# Patient Record
Sex: MALE | Race: WHITE | NOT HISPANIC OR LATINO | Employment: UNEMPLOYED | URBAN - NONMETROPOLITAN AREA
[De-identification: names, ages, dates, MRNs, and addresses within clinical notes are randomized per-mention and may not be internally consistent; named-entity substitution may affect disease eponyms.]

---

## 2018-11-22 ENCOUNTER — APPOINTMENT (OUTPATIENT)
Dept: GENERAL RADIOLOGY | Facility: HOSPITAL | Age: 43
End: 2018-11-22

## 2018-11-22 ENCOUNTER — HOSPITAL ENCOUNTER (EMERGENCY)
Facility: HOSPITAL | Age: 43
Discharge: HOME | End: 2018-11-22
Attending: FAMILY MEDICINE

## 2018-11-22 ENCOUNTER — APPOINTMENT (OUTPATIENT)
Dept: CT IMAGING | Facility: HOSPITAL | Age: 43
End: 2018-11-22

## 2018-11-22 VITALS
OXYGEN SATURATION: 98 % | TEMPERATURE: 98.6 F | WEIGHT: 132 LBS | HEART RATE: 52 BPM | HEIGHT: 65 IN | RESPIRATION RATE: 16 BRPM | BODY MASS INDEX: 21.99 KG/M2 | DIASTOLIC BLOOD PRESSURE: 77 MMHG | SYSTOLIC BLOOD PRESSURE: 135 MMHG

## 2018-11-22 DIAGNOSIS — R51.9 GENERALIZED HEADACHES: Primary | ICD-10-CM

## 2018-11-22 LAB
ALBUMIN SERPL-MCNC: 4.4 G/DL (ref 3.5–5)
ALBUMIN/GLOB SERPL: 1.6 G/DL (ref 1.5–2.5)
ALP SERPL-CCNC: 76 U/L (ref 40–129)
ALT SERPL W P-5'-P-CCNC: 23 U/L (ref 10–44)
ANION GAP SERPL CALCULATED.3IONS-SCNC: 0.9 MMOL/L (ref 3.6–11.2)
AST SERPL-CCNC: 23 U/L (ref 10–34)
BACTERIA UR QL AUTO: ABNORMAL /HPF
BASOPHILS # BLD AUTO: 0.03 10*3/MM3 (ref 0–0.3)
BASOPHILS NFR BLD AUTO: 0.3 % (ref 0–2)
BILIRUB SERPL-MCNC: 0.6 MG/DL (ref 0.2–1.8)
BILIRUB UR QL STRIP: NEGATIVE
BUN BLD-MCNC: 12 MG/DL (ref 7–21)
BUN/CREAT SERPL: 12.1 (ref 7–25)
CALCIUM SPEC-SCNC: 9.4 MG/DL (ref 7.7–10)
CHLORIDE SERPL-SCNC: 110 MMOL/L (ref 99–112)
CLARITY UR: CLEAR
CO2 SERPL-SCNC: 26.1 MMOL/L (ref 24.3–31.9)
COLOR UR: YELLOW
CREAT BLD-MCNC: 0.99 MG/DL (ref 0.43–1.29)
DEPRECATED RDW RBC AUTO: 41.8 FL (ref 37–54)
EOSINOPHIL # BLD AUTO: 0.22 10*3/MM3 (ref 0–0.7)
EOSINOPHIL NFR BLD AUTO: 2.1 % (ref 0–5)
ERYTHROCYTE [DISTWIDTH] IN BLOOD BY AUTOMATED COUNT: 12.6 % (ref 11.5–14.5)
GFR SERPL CREATININE-BSD FRML MDRD: 83 ML/MIN/1.73
GLOBULIN UR ELPH-MCNC: 2.7 GM/DL
GLUCOSE BLD-MCNC: 99 MG/DL (ref 70–110)
GLUCOSE UR STRIP-MCNC: NEGATIVE MG/DL
HCT VFR BLD AUTO: 42.7 % (ref 42–52)
HGB BLD-MCNC: 14.9 G/DL (ref 14–18)
HGB UR QL STRIP.AUTO: NEGATIVE
HYALINE CASTS UR QL AUTO: ABNORMAL /LPF
IMM GRANULOCYTES # BLD: 0.02 10*3/MM3 (ref 0–0.03)
IMM GRANULOCYTES NFR BLD: 0.2 % (ref 0–0.5)
KETONES UR QL STRIP: NEGATIVE
LEUKOCYTE ESTERASE UR QL STRIP.AUTO: ABNORMAL
LYMPHOCYTES # BLD AUTO: 2.65 10*3/MM3 (ref 1–3)
LYMPHOCYTES NFR BLD AUTO: 25.2 % (ref 21–51)
MCH RBC QN AUTO: 31.6 PG (ref 27–33)
MCHC RBC AUTO-ENTMCNC: 34.9 G/DL (ref 33–37)
MCV RBC AUTO: 90.7 FL (ref 80–94)
MONOCYTES # BLD AUTO: 1.15 10*3/MM3 (ref 0.1–0.9)
MONOCYTES NFR BLD AUTO: 11 % (ref 0–10)
NEUTROPHILS # BLD AUTO: 6.43 10*3/MM3 (ref 1.4–6.5)
NEUTROPHILS NFR BLD AUTO: 61.2 % (ref 30–70)
NITRITE UR QL STRIP: NEGATIVE
OSMOLALITY SERPL CALC.SUM OF ELEC: 273.6 MOSM/KG (ref 273–305)
PH UR STRIP.AUTO: 8 [PH] (ref 5–8)
PLATELET # BLD AUTO: 271 10*3/MM3 (ref 130–400)
PMV BLD AUTO: 9.2 FL (ref 6–10)
POTASSIUM BLD-SCNC: 4.4 MMOL/L (ref 3.5–5.3)
PROT SERPL-MCNC: 7.1 G/DL (ref 6–8)
PROT UR QL STRIP: NEGATIVE
RBC # BLD AUTO: 4.71 10*6/MM3 (ref 4.7–6.1)
RBC # UR: ABNORMAL /HPF
REF LAB TEST METHOD: ABNORMAL
SODIUM BLD-SCNC: 137 MMOL/L (ref 135–153)
SP GR UR STRIP: 1.02 (ref 1–1.03)
SQUAMOUS #/AREA URNS HPF: ABNORMAL /HPF
UROBILINOGEN UR QL STRIP: ABNORMAL
WBC NRBC COR # BLD: 10.5 10*3/MM3 (ref 4.5–12.5)
WBC UR QL AUTO: ABNORMAL /HPF

## 2018-11-22 PROCEDURE — 80053 COMPREHEN METABOLIC PANEL: CPT | Performed by: PHYSICIAN ASSISTANT

## 2018-11-22 PROCEDURE — 71045 X-RAY EXAM CHEST 1 VIEW: CPT

## 2018-11-22 PROCEDURE — 99284 EMERGENCY DEPT VISIT MOD MDM: CPT

## 2018-11-22 PROCEDURE — 85025 COMPLETE CBC W/AUTO DIFF WBC: CPT | Performed by: PHYSICIAN ASSISTANT

## 2018-11-22 PROCEDURE — 36415 COLL VENOUS BLD VENIPUNCTURE: CPT

## 2018-11-22 PROCEDURE — 70450 CT HEAD/BRAIN W/O DYE: CPT | Performed by: RADIOLOGY

## 2018-11-22 PROCEDURE — 81001 URINALYSIS AUTO W/SCOPE: CPT | Performed by: PHYSICIAN ASSISTANT

## 2018-11-22 PROCEDURE — 70450 CT HEAD/BRAIN W/O DYE: CPT

## 2018-11-22 PROCEDURE — 71045 X-RAY EXAM CHEST 1 VIEW: CPT | Performed by: RADIOLOGY

## 2018-11-23 NOTE — ED PROVIDER NOTES
Subjective   43-year-old male presents to the ER with multiple complaints.  Patient admits that he has a headache that has presented in the occipital area as well as in the center parietal area of the head.  Patientheadaches come and go.  Currently the patient denies headache.  Patient does admit to being a heavy smoker of over 3 packs a day.  Patient denies any chest pain.  Patient continues to state that he has a day off work and just wants to be checked out.  Discussed with patient that ultimately a lot of his complaints need to be addressed by his primary care provider.  Patient understands.            Review of Systems   Constitutional: Negative.  Negative for fever.   Respiratory: Negative.    Cardiovascular: Negative.  Negative for chest pain.   Gastrointestinal: Negative.  Negative for abdominal pain.   Endocrine: Negative.    Genitourinary: Negative.  Negative for dysuria.   Skin: Negative.    Neurological: Positive for headaches.   Psychiatric/Behavioral: Negative.    All other systems reviewed and are negative.      History reviewed. No pertinent past medical history.    Allergies   Allergen Reactions   • Diphenhydramine Delirium       Past Surgical History:   Procedure Laterality Date   • HERNIA REPAIR         History reviewed. No pertinent family history.    Social History     Socioeconomic History   • Marital status: Single     Spouse name: Not on file   • Number of children: Not on file   • Years of education: Not on file   • Highest education level: Not on file   Tobacco Use   • Smoking status: Heavy Tobacco Smoker   Substance and Sexual Activity   • Alcohol use: No     Frequency: Never   • Drug use: No           Objective   Physical Exam   Constitutional: He is oriented to person, place, and time. He appears well-developed and well-nourished. No distress.   HENT:   Head: Normocephalic and atraumatic.   Right Ear: External ear normal.   Left Ear: External ear normal.   Nose: Nose normal.   Eyes:  Conjunctivae and EOM are normal. Pupils are equal, round, and reactive to light.   Neck: Normal range of motion. Neck supple. No JVD present. No tracheal deviation present.   Cardiovascular: Normal rate, regular rhythm and normal heart sounds.   No murmur heard.  Pulmonary/Chest: Effort normal and breath sounds normal. No respiratory distress. He has no wheezes.   Abdominal: Soft. Bowel sounds are normal. There is no tenderness.   Musculoskeletal: Normal range of motion. He exhibits no edema or deformity.   Neurological: He is alert and oriented to person, place, and time. No cranial nerve deficit.   Skin: Skin is warm and dry. No rash noted. He is not diaphoretic. No erythema. No pallor.   Psychiatric: He has a normal mood and affect. His behavior is normal. Thought content normal.   Nursing note and vitals reviewed.      Procedures           ED Course  ED Course as of Nov 23 1455   Thu Nov 22, 2018   1643 CXR by Dr. Moscoso: No apparent acute cardiopulmonary disease.  [RB]   1814 CT head interpreted by virtual radiology: No acute findings.  [RB]      ED Course User Index  [RB] Kaleb Boyer PA                  MDM  Number of Diagnoses or Management Options  Generalized headaches: new and requires workup     Amount and/or Complexity of Data Reviewed  Clinical lab tests: ordered and reviewed  Tests in the radiology section of CPT®: ordered and reviewed  Discuss the patient with other providers: yes    Risk of Complications, Morbidity, and/or Mortality  Presenting problems: low  Diagnostic procedures: low  Management options: low    Patient Progress  Patient progress: stable        Final diagnoses:   Generalized headaches            Kaleb Boyer PA  11/23/18 [id number]

## 2019-06-12 ENCOUNTER — OFFICE VISIT (OUTPATIENT)
Dept: PHARMACY | Facility: HOSPITAL | Age: 44
End: 2019-06-12

## 2019-06-12 VITALS
HEART RATE: 64 BPM | HEIGHT: 65 IN | DIASTOLIC BLOOD PRESSURE: 82 MMHG | WEIGHT: 141 LBS | OXYGEN SATURATION: 100 % | SYSTOLIC BLOOD PRESSURE: 131 MMHG | BODY MASS INDEX: 23.49 KG/M2

## 2019-06-12 DIAGNOSIS — B19.20 HEPATITIS C VIRUS INFECTION, UNSPECIFIED CHRONICITY: Primary | ICD-10-CM

## 2019-06-12 DIAGNOSIS — Z86.19 HISTORY OF HEPATITIS A: ICD-10-CM

## 2019-06-12 LAB
ALBUMIN SERPL-MCNC: 3.9 G/DL (ref 3.5–5.2)
ALBUMIN/GLOB SERPL: 1 G/DL
ALP SERPL-CCNC: 136 U/L (ref 39–117)
ALPHA-FETOPROTEIN: 4.52 NG/ML (ref 0–8.3)
ALT SERPL W P-5'-P-CCNC: 155 U/L (ref 1–41)
ANION GAP SERPL CALCULATED.3IONS-SCNC: 12.9 MMOL/L
AST SERPL-CCNC: 74 U/L (ref 1–40)
BILIRUB SERPL-MCNC: 1.3 MG/DL (ref 0.2–1.2)
BUN BLD-MCNC: 10 MG/DL (ref 6–20)
BUN/CREAT SERPL: 9.2 (ref 7–25)
CALCIUM SPEC-SCNC: 9.6 MG/DL (ref 8.6–10.5)
CHLORIDE SERPL-SCNC: 102 MMOL/L (ref 98–107)
CO2 SERPL-SCNC: 26.1 MMOL/L (ref 22–29)
CREAT BLD-MCNC: 1.09 MG/DL (ref 0.76–1.27)
DEPRECATED RDW RBC AUTO: 52.5 FL (ref 37–54)
ERYTHROCYTE [DISTWIDTH] IN BLOOD BY AUTOMATED COUNT: 15.3 % (ref 12.3–15.4)
GFR SERPL CREATININE-BSD FRML MDRD: 74 ML/MIN/1.73
GLOBULIN UR ELPH-MCNC: 4 GM/DL
GLUCOSE BLD-MCNC: 71 MG/DL (ref 65–99)
HBV SURFACE AB SER RIA-ACNC: NORMAL
HCT VFR BLD AUTO: 41.5 % (ref 37.5–51)
HGB BLD-MCNC: 14.1 G/DL (ref 13–17.7)
HIV1+2 AB SER QL: NORMAL
INR PPP: 0.89 (ref 0.9–1.1)
MCH RBC QN AUTO: 31.8 PG (ref 26.6–33)
MCHC RBC AUTO-ENTMCNC: 34 G/DL (ref 31.5–35.7)
MCV RBC AUTO: 93.7 FL (ref 79–97)
PLATELET # BLD AUTO: 352 10*3/MM3 (ref 140–450)
PMV BLD AUTO: 9.7 FL (ref 6–12)
POTASSIUM BLD-SCNC: 3.9 MMOL/L (ref 3.5–5.2)
PROT SERPL-MCNC: 7.9 G/DL (ref 6–8.5)
PROTHROMBIN TIME: 12.5 SECONDS (ref 11–15.4)
RBC # BLD AUTO: 4.43 10*6/MM3 (ref 4.14–5.8)
SODIUM BLD-SCNC: 141 MMOL/L (ref 136–145)
WBC NRBC COR # BLD: 8.29 10*3/MM3 (ref 3.4–10.8)

## 2019-06-12 PROCEDURE — 36415 COLL VENOUS BLD VENIPUNCTURE: CPT

## 2019-06-12 PROCEDURE — 81596 NFCT DS CHRNC HCV 6 ASSAYS: CPT

## 2019-06-12 PROCEDURE — 99243 OFF/OP CNSLTJ NEW/EST LOW 30: CPT | Performed by: PHYSICIAN ASSISTANT

## 2019-06-12 PROCEDURE — 85027 COMPLETE CBC AUTOMATED: CPT

## 2019-06-12 PROCEDURE — 87522 HEPATITIS C REVRS TRNSCRPJ: CPT

## 2019-06-12 PROCEDURE — 86708 HEPATITIS A ANTIBODY: CPT

## 2019-06-12 PROCEDURE — 80053 COMPREHEN METABOLIC PANEL: CPT

## 2019-06-12 PROCEDURE — 82105 ALPHA-FETOPROTEIN SERUM: CPT

## 2019-06-12 PROCEDURE — 86704 HEP B CORE ANTIBODY TOTAL: CPT

## 2019-06-12 PROCEDURE — 85610 PROTHROMBIN TIME: CPT

## 2019-06-12 PROCEDURE — 86592 SYPHILIS TEST NON-TREP QUAL: CPT

## 2019-06-12 PROCEDURE — 86706 HEP B SURFACE ANTIBODY: CPT

## 2019-06-12 PROCEDURE — 80074 ACUTE HEPATITIS PANEL: CPT

## 2019-06-12 PROCEDURE — G0432 EIA HIV-1/HIV-2 SCREEN: HCPCS

## 2019-06-12 NOTE — PROGRESS NOTES
: 1975    Chief Complaint   Patient presents with   • Hepatitis C       Rico Loaiza is a 43 y.o. male who presents to the office today as a consultation from JANAY Hanks for evaluation of Hepatitis C.    History of Present Illness:  He has known about having Hep C infection for approximately 1 month. No previous IVDU. Does report intranasal drug use in the past, approx 2 months ago. He has been using marijuana daily since he found out about the hepatitis. He states that it helped to relieve his itching which was very severe. This is some better now. He was diagnosed with lung infection at first and was given antibiotics and thinks he could have been allergic to the antibiotic and was given steroids. He was evaluated in the ED and was jaundice at [facility], diagnosed with acute hepatitis (there was some mention of Hep A at that time). He does not drink alcohol, no past history of alcoholism. No known family history of cirrhosis. No known prior Hepatitis B. Thinks he had liver imaging while he was in the ED. Does report vague history of genital lesions and never had testing regarding this.    Labs from PCP 3/2018:  H/H normal   Platelets normal  AST 14  ALT 16  AP 80  Bilirubin normal  Thyroid normal  Hgb A1c 5.6  Total cholesterol normal  TG normal    Review of Systems   Constitutional: Positive for activity change, chills, fever and unexpected weight change.   HENT: Negative for sore throat and trouble swallowing.    Eyes: Negative.    Respiratory: Positive for chest tightness and shortness of breath.    Cardiovascular: Positive for chest pain. Negative for leg swelling.   Gastrointestinal: Positive for abdominal distention, abdominal pain, anal bleeding, nausea and vomiting. Negative for blood in stool, constipation, diarrhea and rectal pain.   Endocrine: Negative.    Genitourinary: Positive for difficulty urinating and hematuria.   Musculoskeletal: Positive for back pain, neck pain and neck stiffness.  "  Skin: Negative for color change and rash.   Allergic/Immunologic: Negative for environmental allergies and food allergies.   Neurological: Positive for dizziness and headaches. Negative for seizures.   Hematological: Does not bruise/bleed easily.   Psychiatric/Behavioral: Positive for agitation and sleep disturbance. Negative for suicidal ideas. The patient is nervous/anxious.        Past Medical History:   Diagnosis Date   • GERD (gastroesophageal reflux disease)    • Infectious viral hepatitis        Past Surgical History:   Procedure Laterality Date   • HERNIA REPAIR     • HERNIA REPAIR         Family History   Problem Relation Age of Onset   • Irritable bowel syndrome Mother    • Colon cancer Paternal Grandfather    • Colon polyps Paternal Grandfather        Social History     Socioeconomic History   • Marital status: Single     Spouse name: Not on file   • Number of children: Not on file   • Years of education: Not on file   • Highest education level: Not on file   Tobacco Use   • Smoking status: Current Every Day Smoker     Packs/day: 2.00     Years: 15.00     Pack years: 30.00   • Smokeless tobacco: Never Used   Substance and Sexual Activity   • Sexual activity: No         Current Outpatient Medications:   •  Arginine 500 MG capsule, Take  by mouth., Disp: , Rfl:   •  Misc Natural Products (NF FORMULAS TESTOSTERONE) capsule, Take  by mouth., Disp: , Rfl:     Allergies:   Diphenhydramine    Vitals:  /82   Pulse 64   Ht 165.1 cm (65\")   Wt 64 kg (141 lb)   SpO2 100%   BMI 23.46 kg/m²     Physical Exam   Constitutional: He is oriented to person, place, and time. He appears well-developed and well-nourished. No distress.   HENT:   Head: Normocephalic and atraumatic.   Nose: Nose normal.   Mouth/Throat: Oropharynx is clear and moist.   Eyes: Conjunctivae are normal. Right eye exhibits no discharge. Left eye exhibits no discharge. No scleral icterus.   Neck: Normal range of motion. No JVD present. "   Cardiovascular: Normal rate, regular rhythm and normal heart sounds. Exam reveals no gallop and no friction rub.   No murmur heard.  Pulmonary/Chest: Effort normal and breath sounds normal. No respiratory distress. He has no wheezes. He has no rales. He exhibits no tenderness.   Abdominal: Soft. Bowel sounds are normal. He exhibits no mass. There is no tenderness.   Musculoskeletal: Normal range of motion. He exhibits no edema or deformity.   Neurological: He is alert and oriented to person, place, and time. Coordination normal.   Skin: Skin is warm and dry. No rash noted. He is not diaphoretic. No erythema.   Mild jaundice   Psychiatric: He has a normal mood and affect. His behavior is normal. Judgment and thought content normal.   Vitals reviewed.      Assessment/Plan:  1. Hepatitis C virus infection, unspecified chronicity    2. History of hepatitis A      Orders Placed This Encounter   Procedures   • AFP Tumor Marker   • CBC (No Diff)   • Comprehensive Metabolic Panel   • HCV FibroSURE   • HCV RNA By PCR, Qn Rfx Sade   • Hepatitis A Antibody, Total   • Hepatitis B Core Antibody, Total   • Hepatitis B Surface Antibody   • Hepatitis Panel, Acute   • HIV-1 / O / 2 Ag / Antibody 4th Generation   • Protime-INR   • RPR     Labs from PCP do not show positive Hep C. He will have labs today for further evaluation of recent Hep A and reported Hep C. If he truly has Hep C infection, we will need to wait for resolution of acute Hep A before starting direct acting antivirals. He will be checked for hep B and immunity to Hep B as well. He was directed to abstain from any illicit drug use or alcohol use.     We will request copies of [facility] imaging for review.        Follow up will be dependent on lab results.       Electronically signed 6/14/2019 2:23 PM  Esperanza Aguirre PA-C, [facility]

## 2019-06-13 LAB
HAV AB SER QL IA: POSITIVE
HAV IGM SERPL QL IA: POSITIVE
HBV CORE AB SER DONR QL IA: NEGATIVE
HBV CORE IGM SERPL QL IA: NEGATIVE
HBV SURFACE AG SERPL QL IA: NEGATIVE
HCV AB S/CO SERPL IA: 0.1 S/CO RATIO (ref 0–0.9)
RPR SER QL: NORMAL

## 2019-06-14 ENCOUNTER — TELEPHONE (OUTPATIENT)
Dept: PHARMACY | Facility: HOSPITAL | Age: 44
End: 2019-06-14

## 2019-06-14 ENCOUNTER — TELEPHONE (OUTPATIENT)
Dept: GASTROENTEROLOGY | Facility: CLINIC | Age: 44
End: 2019-06-14

## 2019-06-14 LAB
A2 MACROGLOB SERPL-MCNC: 212 MG/DL (ref 110–276)
ALT SERPL W P-5'-P-CCNC: 200 IU/L (ref 0–55)
APO A-I SERPL-MCNC: 112 MG/DL (ref 101–178)
BILIRUB SERPL-MCNC: 1.2 MG/DL (ref 0–1.2)
FIBROSIS SCORING:: ABNORMAL
FIBROSIS STAGE SERPL QL: ABNORMAL
GGT SERPL-CCNC: 129 IU/L (ref 0–65)
HAPTOGLOB SERPL-MCNC: 122 MG/DL (ref 34–200)
HCV AB SER QL: ABNORMAL
LABORATORY COMMENT REPORT: ABNORMAL
LIMITATIONS:: ABNORMAL
LIVER FIBR SCORE SERPL CALC.FIBROSURE: 0.61 (ref 0–0.21)
NECROINFLAMM ACTIVITY SCORING:: ABNORMAL
NECROINFLAMMATORY ACT GRADE SERPL QL: ABNORMAL
NECROINFLAMMATORY ACT SCORE SERPL: 0.87 (ref 0–0.17)

## 2019-06-15 LAB
HCV GENTYP SERPL NAA+PROBE: NORMAL
HCV RNA SERPL NAA+PROBE-ACNC: NORMAL IU/ML
HCV RNA SERPL NAA+PROBE-LOG IU: NORMAL LOG10 IU/ML
REF LAB TEST REF RANGE: NORMAL

## 2019-06-17 ENCOUNTER — TELEPHONE (OUTPATIENT)
Dept: GASTROENTEROLOGY | Facility: CLINIC | Age: 44
End: 2019-06-17

## 2019-06-17 NOTE — TELEPHONE ENCOUNTER
Please arrange appt with me at hepatitis clinic in approx 4-6 weeks. Let him know that results are showing negative for Hep C and positive for Hep A.

## 2019-06-27 NOTE — TELEPHONE ENCOUNTER
Patient aware of results and follow up visit is scheduled.  Patient will be back in the clinic to see you on 7/17/19.

## 2019-07-31 ENCOUNTER — OFFICE VISIT (OUTPATIENT)
Dept: PHARMACY | Facility: HOSPITAL | Age: 44
End: 2019-07-31

## 2019-07-31 VITALS
SYSTOLIC BLOOD PRESSURE: 110 MMHG | WEIGHT: 141 LBS | OXYGEN SATURATION: 100 % | HEIGHT: 65 IN | DIASTOLIC BLOOD PRESSURE: 68 MMHG | HEART RATE: 51 BPM | BODY MASS INDEX: 23.49 KG/M2

## 2019-07-31 DIAGNOSIS — Z23 NEED FOR HEPATITIS B VACCINATION: ICD-10-CM

## 2019-07-31 DIAGNOSIS — R74.8 ABNORMAL AST AND ALT: ICD-10-CM

## 2019-07-31 DIAGNOSIS — Z86.19 HISTORY OF HEPATITIS A: Primary | ICD-10-CM

## 2019-07-31 PROCEDURE — 86709 HEPATITIS A IGM ANTIBODY: CPT

## 2019-07-31 PROCEDURE — 99214 OFFICE O/P EST MOD 30 MIN: CPT | Performed by: PHYSICIAN ASSISTANT

## 2019-07-31 PROCEDURE — 36415 COLL VENOUS BLD VENIPUNCTURE: CPT

## 2019-07-31 PROCEDURE — 80076 HEPATIC FUNCTION PANEL: CPT

## 2019-08-01 ENCOUNTER — TELEPHONE (OUTPATIENT)
Dept: GASTROENTEROLOGY | Facility: CLINIC | Age: 44
End: 2019-08-01

## 2019-08-01 LAB
ALBUMIN SERPL-MCNC: 4.5 G/DL (ref 3.5–5.2)
ALP SERPL-CCNC: 104 U/L (ref 39–117)
ALT SERPL W P-5'-P-CCNC: 123 U/L (ref 1–41)
AST SERPL-CCNC: 68 U/L (ref 1–40)
BILIRUB CONJ SERPL-MCNC: 0.2 MG/DL (ref 0.2–0.3)
BILIRUB INDIRECT SERPL-MCNC: 0.4 MG/DL
BILIRUB SERPL-MCNC: 0.6 MG/DL (ref 0.2–1.2)
HAV IGM SERPL QL IA: REACTIVE
PROT SERPL-MCNC: 8 G/DL (ref 6–8.5)

## 2019-08-01 NOTE — TELEPHONE ENCOUNTER
Please let pt know liver enzymes are improving but still elevated. He does still have detectable Hep A. I recommend that he have labs with PCP in 6 months to make sure they are back to normal.

## 2021-10-07 ENCOUNTER — HOSPITAL ENCOUNTER (OUTPATIENT)
Dept: HOSPITAL 79 - KOH-I | Age: 46
End: 2021-10-07
Attending: NURSE PRACTITIONER
Payer: COMMERCIAL

## 2021-10-07 DIAGNOSIS — M47.816: ICD-10-CM

## 2021-10-07 DIAGNOSIS — M54.40: Primary | ICD-10-CM

## 2023-04-28 ENCOUNTER — OFFICE VISIT (OUTPATIENT)
Dept: CARDIOLOGY | Facility: CLINIC | Age: 48
End: 2023-04-28
Payer: COMMERCIAL

## 2023-04-28 VITALS
SYSTOLIC BLOOD PRESSURE: 115 MMHG | WEIGHT: 140 LBS | RESPIRATION RATE: 16 BRPM | HEART RATE: 57 BPM | HEIGHT: 65 IN | DIASTOLIC BLOOD PRESSURE: 69 MMHG | BODY MASS INDEX: 23.32 KG/M2 | OXYGEN SATURATION: 97 %

## 2023-04-28 DIAGNOSIS — E78.5 DYSLIPIDEMIA: ICD-10-CM

## 2023-04-28 DIAGNOSIS — R06.09 DYSPNEA ON EXERTION: Primary | ICD-10-CM

## 2023-04-28 DIAGNOSIS — R07.2 PRECORDIAL PAIN: ICD-10-CM

## 2023-04-28 DIAGNOSIS — Z72.0 TOBACCO ABUSE: ICD-10-CM

## 2023-04-28 PROCEDURE — 93000 ELECTROCARDIOGRAM COMPLETE: CPT | Performed by: INTERNAL MEDICINE

## 2023-04-28 PROCEDURE — 1160F RVW MEDS BY RX/DR IN RCRD: CPT | Performed by: INTERNAL MEDICINE

## 2023-04-28 PROCEDURE — 1159F MED LIST DOCD IN RCRD: CPT | Performed by: INTERNAL MEDICINE

## 2023-04-28 PROCEDURE — 99204 OFFICE O/P NEW MOD 45 MIN: CPT | Performed by: INTERNAL MEDICINE

## 2023-04-28 RX ORDER — CHLORAL HYDRATE 500 MG
2000 CAPSULE ORAL EVERY 12 HOURS SCHEDULED
COMMUNITY
Start: 2023-04-18

## 2023-04-28 RX ORDER — NAPROXEN 500 MG/1
500 TABLET ORAL TAKE AS DIRECTED
COMMUNITY
Start: 2023-04-18

## 2023-04-28 RX ORDER — NICOTINE 21 MG/24HR
PATCH, TRANSDERMAL 24 HOURS TRANSDERMAL TAKE AS DIRECTED
COMMUNITY
Start: 2023-02-23

## 2023-04-28 RX ORDER — METHOCARBAMOL 750 MG/1
750 TABLET, FILM COATED ORAL 3 TIMES DAILY
COMMUNITY
Start: 2023-04-18

## 2023-04-28 RX ORDER — ASPIRIN 81 MG/1
81 TABLET ORAL DAILY
Qty: 30 TABLET | Refills: 2 | Status: SHIPPED | OUTPATIENT
Start: 2023-04-28

## 2023-04-28 NOTE — PROGRESS NOTES
Fany Rocha APRN  Rico Loaiza  1975  2023    There is no problem list on file for this patient.      Dear Fany Rocha APRN:    Subjective     Rico Loaiza is a 47 y.o. male with the problems as listed above, presents    Chief complaint: Increasing shortness of breath and intermittent chest pain and tightness.    History of Present Illness: Mr. Loaiza is a pleasant 47-year-old  male with no history of known heart disease or coronary artery disease, has history of smoking of up to 2 to 3 packs a day for the last 25 years, nonhypertensive and nondiabetic but has dyslipidemia, presents with complaints of having increasing shortness of breath over the last several months.  He seemed to get short of breath currently with moderate exertion with no PND, orthopnea or pedal edema.  He also complains of intermittent episodes of chest pain and tightness that seem to occur at random both with and without exertion.  This is of moderate intensity and seem to be associated with some shortness of breath.  It usually resolves spontaneously when he takes a deep breath and relaxes.  He denies any palpitation but has some intermittent dizziness.  No syncope.  His father reportedly  from heart attack at age 74.    Cardiac risk factors:hypercholesterolemia, smoking, Positive family Hx. of premature athersclerotivc disease. and Age and male gender    Allergies   Allergen Reactions   • Diphenhydramine Delirium   :    Current Outpatient Medications:   •  methocarbamol (ROBAXIN) 750 MG tablet, Take 1 tablet by mouth 3 (Three) Times a Day., Disp: , Rfl:   •  Misc Natural Products (NF FORMULAS TESTOSTERONE) capsule, Take  by mouth., Disp: , Rfl:   •  naproxen (NAPROSYN) 500 MG tablet, Take 1 tablet by mouth Take As Directed., Disp: , Rfl:   •  nicotine (NICODERM CQ) 21 MG/24HR patch, Take As Directed., Disp: , Rfl:   •  Omega-3 Fatty Acids (fish oil) 1000 MG capsule capsule, Take 2 capsules by mouth  "Every 12 (Twelve) Hours., Disp: , Rfl:   •  aspirin 81 MG EC tablet, Take 1 tablet by mouth Daily., Disp: 30 tablet, Rfl: 2    Past Medical History:   Diagnosis Date   • GERD (gastroesophageal reflux disease)    • Infectious viral hepatitis      Past Surgical History:   Procedure Laterality Date   • HERNIA REPAIR     • HERNIA REPAIR       Family History   Problem Relation Age of Onset   • Irritable bowel syndrome Mother    • Colon cancer Paternal Grandfather    • Colon polyps Paternal Grandfather      Social History     Tobacco Use   • Smoking status: Every Day     Packs/day: 2.00     Years: 15.00     Pack years: 30.00     Types: Cigarettes   • Smokeless tobacco: Never   Substance Use Topics   • Alcohol use: Never   • Drug use: Yes     Types: Marijuana     Review of Systems   Constitutional: Positive for malaise/fatigue.   Eyes: Positive for blurred vision and visual disturbance.   Cardiovascular: Positive for chest pain.   Respiratory: Positive for shortness of breath.    Endocrine: Positive for cold intolerance and heat intolerance.   Skin: Positive for dry skin, itching and rash.   Musculoskeletal: Positive for back pain, joint pain, muscle cramps, muscle weakness, myalgias and stiffness.   Genitourinary: Positive for bladder incontinence.   Neurological: Positive for light-headedness, numbness, paresthesias and weakness.   Psychiatric/Behavioral: Positive for depression and memory loss. The patient is nervous/anxious.      Objective   Blood pressure 115/69, pulse 57, resp. rate 16, height 165.1 cm (65\"), weight 63.5 kg (140 lb), SpO2 97 %.  Body mass index is 23.3 kg/m².    Vitals reviewed.   Constitutional:       Appearance: Well-developed.   Eyes:      Conjunctiva/sclera: Conjunctivae normal.   HENT:      Head: Normocephalic.   Neck:      Thyroid: No thyromegaly.      Vascular: No JVD.      Trachea: No tracheal deviation.   Pulmonary:      Effort: No respiratory distress.      Breath sounds: Normal breath " sounds. No wheezing. No rales.   Cardiovascular:      PMI at left midclavicular line. Normal rate. Regular rhythm. Normal S1. Normal S2.      Murmurs: There is no murmur.      No gallop. No click. No rub.   Pulses:     Intact distal pulses.   Edema:     Peripheral edema absent.   Abdominal:      General: Bowel sounds are normal.      Palpations: Abdomen is soft. There is no abdominal mass.      Tenderness: There is no abdominal tenderness.   Musculoskeletal:      Cervical back: Normal range of motion and neck supple. Skin:     General: Skin is warm and dry.   Neurological:      Mental Status: Alert and oriented to person, place, and time.      Cranial Nerves: No cranial nerve deficit.         ECG 12 Lead    Date/Time: 4/28/2023 10:43 AM  Performed by: Paul Morales MD  Authorized by: Paul Morales MD   Previous ECG: no previous ECG available  Rhythm: sinus rhythm  Conduction: conduction normal  ST Segments: ST segments normal  T Waves: T waves normal    Clinical impression: normal ECG              Assessment & Plan    Diagnosis Plan   1. Dyspnea on exertion( NYHA class II-III)       2. Precordial pain with some typical and some atypical features for CCS class II angina.       3. Tobacco abuse about 2 packs a day.       4. Dyslipidemia            Recommendations  Orders Placed This Encounter   Procedures   • Stress Test With Myocardial Perfusion One Day   • ECG 12 Lead   • Adult Transthoracic Echo Complete W/ Cont if Necessary Per Protocol      1. We will add enteric-coated aspirin 81 mg daily.  2. Evaluate his chest pains further with a stress sestamibi study.  3. Evaluate his dyspnea with an echo Doppler study to assess his left ventricular systolic and diastolic function and tailor further therapy accordingly.  4. I have also strongly emphasized for him to quit smoking.  He said he is going to try hypnosis to quit.    Return in about 5 weeks (around 6/2/2023).    As always, Fany Rocha APRN  I  appreciate very much the opportunity to participate in the cardiovascular care of your patients. Please do not hesitate to call me with any questions with regards to Rico Loaiza's evaluation and management.     With Best Regards,        Paul Morales MD, FACC    Please note that portions of this note were completed with a voice recognition program.

## 2023-04-28 NOTE — LETTER
2023     JANAY Fan  [address] [facility] [address]    Patient: Rico Loaiza   YOB: 1975   Date of Visit: 2023       Dear Fany:    Thank you for referring Rico Loaiza to me for evaluation. Below are the relevant portions of my assessment and plan of care.    If you have questions, please do not hesitate to call me. I look forward to following Rico along with you.         Sincerely,        Paul Morales MD        CC: No Recipients    Paul Morales MD  23 [id number]  Sign when Signing Visit  Fany Rocha APRN  Rico Loaiza  1975  2023    There is no problem list on file for this patient.      Dear Fany Rocha APRN:    Subjective      Rico Loaiza is a 47 y.o. male with the problems as listed above, presents    Chief complaint: Increasing shortness of breath and intermittent chest pain and tightness.    History of Present Illness: Mr. Loaiza is a pleasant 47-year-old  male with no history of known heart disease or coronary artery disease, has history of smoking of up to 2 to 3 packs a day for the last 25 years, nonhypertensive and nondiabetic but has dyslipidemia, presents with complaints of having increasing shortness of breath over the last several months.  He seemed to get short of breath currently with moderate exertion with no PND, orthopnea or pedal edema.  He also complains of intermittent episodes of chest pain and tightness that seem to occur at random both with and without exertion.  This is of moderate intensity and seem to be associated with some shortness of breath.  It usually resolves spontaneously when he takes a deep breath and relaxes.  He denies any palpitation but has some intermittent dizziness.  No syncope.  His father reportedly  from heart attack at age 74.    Cardiac risk factors:hypercholesterolemia, smoking, Positive family Hx. of premature athersclerotivc disease. and Age and male  gender    Allergies   Allergen Reactions   • Diphenhydramine Delirium   :    Current Outpatient Medications:   •  methocarbamol (ROBAXIN) 750 MG tablet, Take 1 tablet by mouth 3 (Three) Times a Day., Disp: , Rfl:   •  Misc Natural Products (NF FORMULAS TESTOSTERONE) capsule, Take  by mouth., Disp: , Rfl:   •  naproxen (NAPROSYN) 500 MG tablet, Take 1 tablet by mouth Take As Directed., Disp: , Rfl:   •  nicotine (NICODERM CQ) 21 MG/24HR patch, Take As Directed., Disp: , Rfl:   •  Omega-3 Fatty Acids (fish oil) 1000 MG capsule capsule, Take 2 capsules by mouth Every 12 (Twelve) Hours., Disp: , Rfl:   •  aspirin 81 MG EC tablet, Take 1 tablet by mouth Daily., Disp: 30 tablet, Rfl: 2    Past Medical History:   Diagnosis Date   • GERD (gastroesophageal reflux disease)    • Infectious viral hepatitis      Past Surgical History:   Procedure Laterality Date   • HERNIA REPAIR     • HERNIA REPAIR       Family History   Problem Relation Age of Onset   • Irritable bowel syndrome Mother    • Colon cancer Paternal Grandfather    • Colon polyps Paternal Grandfather      Social History     Tobacco Use   • Smoking status: Every Day     Packs/day: 2.00     Years: 15.00     Pack years: 30.00     Types: Cigarettes   • Smokeless tobacco: Never   Substance Use Topics   • Alcohol use: Never   • Drug use: Yes     Types: Marijuana     Review of Systems   Constitutional: Positive for malaise/fatigue.   Eyes: Positive for blurred vision and visual disturbance.   Cardiovascular: Positive for chest pain.   Respiratory: Positive for shortness of breath.    Endocrine: Positive for cold intolerance and heat intolerance.   Skin: Positive for dry skin, itching and rash.   Musculoskeletal: Positive for back pain, joint pain, muscle cramps, muscle weakness, myalgias and stiffness.   Genitourinary: Positive for bladder incontinence.   Neurological: Positive for light-headedness, numbness, paresthesias and weakness.   Psychiatric/Behavioral: Positive  "for depression and memory loss. The patient is nervous/anxious.      Objective    Blood pressure 115/69, pulse 57, resp. rate 16, height 165.1 cm (65\"), weight 63.5 kg (140 lb), SpO2 97 %.  Body mass index is 23.3 kg/m².    Vitals reviewed.   Constitutional:       Appearance: Well-developed.   Eyes:      Conjunctiva/sclera: Conjunctivae normal.   HENT:      Head: Normocephalic.   Neck:      Thyroid: No thyromegaly.      Vascular: No JVD.      Trachea: No tracheal deviation.   Pulmonary:      Effort: No respiratory distress.      Breath sounds: Normal breath sounds. No wheezing. No rales.   Cardiovascular:      PMI at left midclavicular line. Normal rate. Regular rhythm. Normal S1. Normal S2.      Murmurs: There is no murmur.      No gallop. No click. No rub.   Pulses:     Intact distal pulses.   Edema:     Peripheral edema absent.   Abdominal:      General: Bowel sounds are normal.      Palpations: Abdomen is soft. There is no abdominal mass.      Tenderness: There is no abdominal tenderness.   Musculoskeletal:      Cervical back: Normal range of motion and neck supple. Skin:     General: Skin is warm and dry.   Neurological:      Mental Status: Alert and oriented to person, place, and time.      Cranial Nerves: No cranial nerve deficit.         ECG 12 Lead    Date/Time: 4/28/2023 10:43 AM  Performed by: Paul Morales MD  Authorized by: Paul Morales MD   Previous ECG: no previous ECG available  Rhythm: sinus rhythm  Conduction: conduction normal  ST Segments: ST segments normal  T Waves: T waves normal    Clinical impression: normal ECG              Assessment & Plan    Diagnosis Plan   1. Dyspnea on exertion( NYHA class II-III)       2. Precordial pain with some typical and some atypical features for CCS class II angina.       3. Tobacco abuse about 2 packs a day.       4. Dyslipidemia            Recommendations  Orders Placed This Encounter   Procedures   • Stress Test With Myocardial Perfusion One Day   • " ECG 12 Lead   • Adult Transthoracic Echo Complete W/ Cont if Necessary Per Protocol      We will add enteric-coated aspirin 81 mg daily.  Evaluate his chest pains further with a stress sestamibi study.  Evaluate his dyspnea with an echo Doppler study to assess his left ventricular systolic and diastolic function and tailor further therapy accordingly.  I have also strongly emphasized for him to quit smoking.  He said he is going to try hypnosis to quit.    Return in about 5 weeks (around 6/2/2023).    As always, Fany Rocha APRN  I appreciate very much the opportunity to participate in the cardiovascular care of your patients. Please do not hesitate to call me with any questions with regards to Rico Loaiza's evaluation and management.     With Best Regards,        Paul Morales MD, FACC    Please note that portions of this note were completed with a voice recognition program.

## 2023-05-31 ENCOUNTER — HOSPITAL ENCOUNTER (OUTPATIENT)
Dept: NUCLEAR MEDICINE | Facility: HOSPITAL | Age: 48
Discharge: HOME | End: 2023-05-31

## 2023-05-31 ENCOUNTER — HOSPITAL ENCOUNTER (OUTPATIENT)
Dept: CARDIOLOGY | Facility: HOSPITAL | Age: 48
Discharge: HOME | End: 2023-05-31

## 2023-05-31 DIAGNOSIS — R06.09 DYSPNEA ON EXERTION: ICD-10-CM

## 2023-05-31 DIAGNOSIS — R07.2 PRECORDIAL PAIN: ICD-10-CM

## 2023-05-31 LAB
BH CV NUCLEAR PRIOR STUDY: 3
BH CV REST NUCLEAR ISOTOPE DOSE: 10.3 MCI
BH CV STRESS BP STAGE 1: NORMAL
BH CV STRESS BP STAGE 2: NORMAL
BH CV STRESS BP STAGE 3: NORMAL
BH CV STRESS DURATION MIN STAGE 1: 3
BH CV STRESS DURATION MIN STAGE 2: 3
BH CV STRESS DURATION MIN STAGE 3: 3
BH CV STRESS DURATION MIN STAGE 4: 1
BH CV STRESS DURATION SEC STAGE 1: 0
BH CV STRESS DURATION SEC STAGE 2: 0
BH CV STRESS DURATION SEC STAGE 3: 0
BH CV STRESS DURATION SEC STAGE 4: 6
BH CV STRESS GRADE STAGE 1: 10
BH CV STRESS GRADE STAGE 2: 12
BH CV STRESS GRADE STAGE 3: 14
BH CV STRESS GRADE STAGE 4: 16
BH CV STRESS HR STAGE 1: 81
BH CV STRESS HR STAGE 2: 95
BH CV STRESS HR STAGE 3: 123
BH CV STRESS HR STAGE 4: 148
BH CV STRESS METS STAGE 1: 5
BH CV STRESS METS STAGE 2: 7.5
BH CV STRESS METS STAGE 3: 10
BH CV STRESS METS STAGE 4: 13.5
BH CV STRESS NUCLEAR ISOTOPE DOSE: 30.1 MCI
BH CV STRESS PROTOCOL 1: NORMAL
BH CV STRESS RECOVERY BP: NORMAL MMHG
BH CV STRESS RECOVERY HR: 66 BPM
BH CV STRESS SPEED STAGE 1: 1.7
BH CV STRESS SPEED STAGE 2: 2.5
BH CV STRESS SPEED STAGE 3: 3.4
BH CV STRESS SPEED STAGE 4: 4.2
BH CV STRESS STAGE 1: 1
BH CV STRESS STAGE 2: 2
BH CV STRESS STAGE 3: 3
BH CV STRESS STAGE 4: 4
LV EF NUC BP: 69 %
MAXIMAL PREDICTED HEART RATE: 173 BPM
PERCENT MAX PREDICTED HR: 85.55 %
STRESS BASELINE BP: NORMAL MMHG
STRESS BASELINE HR: 57 BPM
STRESS PERCENT HR: 101 %
STRESS POST ESTIMATED WORKLOAD: 13.4 METS
STRESS POST EXERCISE DUR MIN: 10 MIN
STRESS POST EXERCISE DUR SEC: 6 SEC
STRESS POST PEAK BP: NORMAL MMHG
STRESS POST PEAK HR: 148 BPM
STRESS TARGET HR: 147 BPM

## 2023-05-31 PROCEDURE — 0 TECHNETIUM SESTAMIBI: Performed by: INTERNAL MEDICINE

## 2023-05-31 PROCEDURE — 78452 HT MUSCLE IMAGE SPECT MULT: CPT

## 2023-05-31 PROCEDURE — A9500 TC99M SESTAMIBI: HCPCS | Performed by: INTERNAL MEDICINE

## 2023-05-31 PROCEDURE — 93306 TTE W/DOPPLER COMPLETE: CPT

## 2023-05-31 PROCEDURE — 93017 CV STRESS TEST TRACING ONLY: CPT

## 2023-05-31 RX ADMIN — TECHNETIUM TC 99M SESTAMIBI 1 DOSE: 1 INJECTION INTRAVENOUS at 10:57

## 2023-05-31 RX ADMIN — TECHNETIUM TC 99M SESTAMIBI 1 DOSE: 1 INJECTION INTRAVENOUS at 08:53

## 2023-06-02 LAB
BH CV ECHO MEAS - ACS: 2.3 CM
BH CV ECHO MEAS - AO MAX PG: 7.7 MMHG
BH CV ECHO MEAS - AO MEAN PG: 4 MMHG
BH CV ECHO MEAS - AO ROOT DIAM: 3 CM
BH CV ECHO MEAS - AO V2 MAX: 139 CM/SEC
BH CV ECHO MEAS - AO V2 VTI: 29.8 CM
BH CV ECHO MEAS - EDV(CUBED): 85.5 ML
BH CV ECHO MEAS - EDV(MOD-SP4): 90.5 ML
BH CV ECHO MEAS - EF(MOD-SP4): 77.2 %
BH CV ECHO MEAS - ESV(CUBED): 21.1 ML
BH CV ECHO MEAS - ESV(MOD-SP4): 20.6 ML
BH CV ECHO MEAS - FS: 37.2 %
BH CV ECHO MEAS - IVS/LVPW: 1.18 CM
BH CV ECHO MEAS - IVSD: 0.89 CM
BH CV ECHO MEAS - LA DIMENSION: 2.7 CM
BH CV ECHO MEAS - LAT PEAK E' VEL: 13.1 CM/SEC
BH CV ECHO MEAS - LV DIASTOLIC VOL/BSA (35-75): 53.2 CM2
BH CV ECHO MEAS - LV MASS(C)D: 113.7 GRAMS
BH CV ECHO MEAS - LV SYSTOLIC VOL/BSA (12-30): 12.1 CM2
BH CV ECHO MEAS - LVIDD: 4.4 CM
BH CV ECHO MEAS - LVIDS: 2.8 CM
BH CV ECHO MEAS - LVOT AREA: 2.5 CM2
BH CV ECHO MEAS - LVOT DIAM: 1.8 CM
BH CV ECHO MEAS - LVPWD: 0.76 CM
BH CV ECHO MEAS - MED PEAK E' VEL: 11.4 CM/SEC
BH CV ECHO MEAS - MV A MAX VEL: 58 CM/SEC
BH CV ECHO MEAS - MV E MAX VEL: 94.1 CM/SEC
BH CV ECHO MEAS - MV E/A: 1.62
BH CV ECHO MEAS - PA ACC TIME: 0.05 SEC
BH CV ECHO MEAS - PA PR(ACCEL): 57 MMHG
BH CV ECHO MEAS - RAP SYSTOLE: 10 MMHG
BH CV ECHO MEAS - RVSP: 34.2 MMHG
BH CV ECHO MEAS - SI(MOD-SP4): 41.1 ML/M2
BH CV ECHO MEAS - SV(MOD-SP4): 69.9 ML
BH CV ECHO MEAS - TAPSE (>1.6): 2.8 CM
BH CV ECHO MEAS - TR MAX PG: 24.2 MMHG
BH CV ECHO MEAS - TR MAX VEL: 246 CM/SEC
BH CV ECHO MEASUREMENTS AVERAGE E/E' RATIO: 7.68
LEFT ATRIUM VOLUME INDEX: 17.4 ML/M2
MAXIMAL PREDICTED HEART RATE: 173 BPM
STRESS TARGET HR: 147 BPM

## 2023-06-08 ENCOUNTER — OFFICE VISIT (OUTPATIENT)
Dept: CARDIOLOGY | Facility: CLINIC | Age: 48
End: 2023-06-08
Payer: COMMERCIAL

## 2023-06-08 VITALS
DIASTOLIC BLOOD PRESSURE: 78 MMHG | RESPIRATION RATE: 16 BRPM | HEIGHT: 66 IN | HEART RATE: 60 BPM | OXYGEN SATURATION: 100 % | WEIGHT: 138.6 LBS | SYSTOLIC BLOOD PRESSURE: 132 MMHG | BODY MASS INDEX: 22.28 KG/M2

## 2023-06-08 DIAGNOSIS — R07.2 PRECORDIAL PAIN: Primary | ICD-10-CM

## 2023-06-08 PROCEDURE — 99214 OFFICE O/P EST MOD 30 MIN: CPT | Performed by: PHYSICIAN ASSISTANT

## 2023-06-08 PROCEDURE — 1160F RVW MEDS BY RX/DR IN RCRD: CPT | Performed by: PHYSICIAN ASSISTANT

## 2023-06-08 PROCEDURE — 1159F MED LIST DOCD IN RCRD: CPT | Performed by: PHYSICIAN ASSISTANT

## 2023-06-08 NOTE — PROGRESS NOTES
Fany Rocha APRN  Rico Loaiza  1975  06/08/2023    There is no problem list on file for this patient.      Dear Fany Rocha APRN:    Subjective     History of Present Illness:    Chief Complaint   Patient presents with    Follow-up     Echo and stress findings    Shortness of Breath     Routine activity    Med Management     verbal       Rico Loaiza is a pleasant 47 y.o. male with a past medical history significant for tobacco abuse with roughly a 50-75-pack-year history, nonhypertensive, nondiabetic.  He does have dyslipidemia.  He comes in today for cardiology follow-up.    Dario did have stress test and echocardiogram stress test revealed normal myocardial fusion with no evidence of ischemia.  Echocardiogram showed normal LVEF with no significant valvular abnormalities.  Clinically he still reports chest tightness/pressure several times weekly that last for several minutes in duration.  He denies any known aggravating or alleviating factors.  He denies any worsening shortness of breath from baseline.    Allergies   Allergen Reactions    Diphenhydramine Delirium   :      Current Outpatient Medications:     aspirin 81 MG EC tablet, Take 1 tablet by mouth Daily., Disp: 30 tablet, Rfl: 2    methocarbamol (ROBAXIN) 750 MG tablet, Take 1 tablet by mouth 3 (Three) Times a Day., Disp: , Rfl:     Misc Natural Products (NF FORMULAS TESTOSTERONE) capsule, Take  by mouth., Disp: , Rfl:     naproxen (NAPROSYN) 500 MG tablet, Take 1 tablet by mouth Take As Directed., Disp: , Rfl:     nicotine (NICODERM CQ) 21 MG/24HR patch, Take As Directed., Disp: , Rfl:     Omega-3 Fatty Acids (fish oil) 1000 MG capsule capsule, Take 2 capsules by mouth Every 12 (Twelve) Hours., Disp: , Rfl:     The following portions of the patient's history were reviewed and updated as appropriate: allergies, current medications, past family history, past medical history, past social history, past surgical history and problem  "list.    Social History     Tobacco Use    Smoking status: Every Day     Packs/day: 2.00     Years: 15.00     Pack years: 30.00     Types: Cigarettes    Smokeless tobacco: Never   Substance Use Topics    Alcohol use: Never    Drug use: Yes     Types: Marijuana         Objective   Vitals:    06/08/23 1352   BP: 132/78   Pulse: 60   Resp: 16   SpO2: 100%   Weight: 62.9 kg (138 lb 9.6 oz)   Height: 167.6 cm (66\")     Body mass index is 22.37 kg/m².    Constitutional:       General: Not in acute distress.     Appearance: Healthy appearance. Well-developed and not in distress. Not diaphoretic.   Eyes:      Conjunctiva/sclera: Conjunctivae normal.      Pupils: Pupils are equal, round, and reactive to light.   HENT:      Head: Normocephalic and atraumatic.   Neck:      Vascular: No carotid bruit or JVD.   Pulmonary:      Effort: Pulmonary effort is normal. No respiratory distress.      Breath sounds: Normal breath sounds.   Cardiovascular:      Normal rate. Regular rhythm.   Edema:     Peripheral edema absent.   Skin:     General: Skin is cool.   Neurological:      Mental Status: Alert, oriented to person, place, and time and oriented to person, place and time.       Lab Results   Component Value Date     06/12/2019    K 3.9 06/12/2019     06/12/2019    CO2 26.1 06/12/2019    BUN 10 06/12/2019    CREATININE 1.09 06/12/2019    GLUCOSE 71 06/12/2019    CALCIUM 9.6 06/12/2019    AST 68 (H) 07/31/2019     (H) 07/31/2019    ALKPHOS 104 07/31/2019     No results found for: CKTOTAL  Lab Results   Component Value Date    WBC 8.29 06/12/2019    HGB 14.1 06/12/2019    HCT 41.5 06/12/2019     06/12/2019     Lab Results   Component Value Date    INR 0.89 (L) 06/12/2019     No results found for: MG  No results found for: TSH, PSA, CHLPL, TRIG, HDL, LDL   No results found for: BNP    During this visit the following were done:  Labs Reviewed []    Labs Ordered []    Radiology Reports Reviewed []    Radiology " Ordered []    PCP Records Reviewed []    Referring Provider Records Reviewed []    ER Records Reviewed []    Hospital Records Reviewed []    History Obtained From Family []    Radiology Images Reviewed []    Other Reviewed []    Records Requested []       Procedures    Assessment & Plan    Diagnosis Plan   1. Precordial pain  Comprehensive Metabolic Panel    Lipid Panel    CBC (No Diff)    CT Angiogram Coronary    TSH               Recommendations:  Precordial pain  Given persistent chest pains will order CT coronary angiogram for further evaluation.  Check CMP, lipid panel, and TSH and CBC  Discussed results of stress test and echocardiogram.    Return in about 3 months (around 9/8/2023).    As always, I appreciate very much the opportunity to participate in the cardiovascular care of your patients.      With Best Regards,    Bob Maldonado PA-C

## 2023-07-21 PROBLEM — R07.2 PRECORDIAL PAIN: Status: ACTIVE | Noted: 2023-07-21

## 2023-07-27 ENCOUNTER — HOSPITAL ENCOUNTER (OUTPATIENT)
Facility: HOSPITAL | Age: 48
Setting detail: OBSERVATION
Discharge: HOME | End: 2023-07-28
Attending: INTERNAL MEDICINE | Admitting: INTERNAL MEDICINE
Payer: COMMERCIAL

## 2023-07-27 DIAGNOSIS — R07.2 PRECORDIAL PAIN: ICD-10-CM

## 2023-07-27 PROBLEM — I25.10 CAD (CORONARY ARTERY DISEASE), NATIVE CORONARY ARTERY: Status: ACTIVE | Noted: 2023-07-27

## 2023-07-27 LAB
ALBUMIN SERPL-MCNC: 4.2 G/DL (ref 3.5–5.2)
ALBUMIN/GLOB SERPL: 1.6 G/DL
ALP SERPL-CCNC: 67 U/L (ref 39–117)
ALT SERPL W P-5'-P-CCNC: 15 U/L (ref 1–41)
ANION GAP SERPL CALCULATED.3IONS-SCNC: 9.3 MMOL/L (ref 5–15)
AST SERPL-CCNC: 21 U/L (ref 1–40)
BILIRUB SERPL-MCNC: 0.3 MG/DL (ref 0–1.2)
BUN SERPL-MCNC: 8 MG/DL (ref 6–20)
BUN/CREAT SERPL: 9 (ref 7–25)
CALCIUM SPEC-SCNC: 8.7 MG/DL (ref 8.6–10.5)
CHLORIDE SERPL-SCNC: 98 MMOL/L (ref 98–107)
CHOLEST SERPL-MCNC: 118 MG/DL (ref 0–200)
CO2 SERPL-SCNC: 23.7 MMOL/L (ref 22–29)
CREAT SERPL-MCNC: 0.89 MG/DL (ref 0.76–1.27)
DEPRECATED RDW RBC AUTO: 42.4 FL (ref 37–54)
EGFRCR SERPLBLD CKD-EPI 2021: 106.4 ML/MIN/1.73
ERYTHROCYTE [DISTWIDTH] IN BLOOD BY AUTOMATED COUNT: 12.5 % (ref 12.3–15.4)
GLOBULIN UR ELPH-MCNC: 2.7 GM/DL
GLUCOSE SERPL-MCNC: 97 MG/DL (ref 65–99)
HCT VFR BLD AUTO: 37.7 % (ref 37.5–51)
HDLC SERPL-MCNC: 45 MG/DL (ref 40–60)
HGB BLD-MCNC: 13.3 G/DL (ref 13–17.7)
LDLC SERPL CALC-MCNC: 61 MG/DL (ref 0–100)
LDLC/HDLC SERPL: 1.4 {RATIO}
MCH RBC QN AUTO: 32.5 PG (ref 26.6–33)
MCHC RBC AUTO-ENTMCNC: 35.3 G/DL (ref 31.5–35.7)
MCV RBC AUTO: 92.2 FL (ref 79–97)
PLATELET # BLD AUTO: 246 10*3/MM3 (ref 140–450)
PMV BLD AUTO: 8.8 FL (ref 6–12)
POTASSIUM SERPL-SCNC: 4.3 MMOL/L (ref 3.5–5.2)
PROT SERPL-MCNC: 6.9 G/DL (ref 6–8.5)
RBC # BLD AUTO: 4.09 10*6/MM3 (ref 4.14–5.8)
SODIUM SERPL-SCNC: 131 MMOL/L (ref 136–145)
TRIGL SERPL-MCNC: 50 MG/DL (ref 0–150)
TSH SERPL DL<=0.05 MIU/L-ACNC: 1.25 UIU/ML (ref 0.27–4.2)
VLDLC SERPL-MCNC: 12 MG/DL (ref 5–40)
WBC NRBC COR # BLD: 6.99 10*3/MM3 (ref 3.4–10.8)

## 2023-07-27 PROCEDURE — 25010000002 EPTIFIBATIDE 20 MG/10ML SOLUTION: Performed by: INTERNAL MEDICINE

## 2023-07-27 PROCEDURE — C1874 STENT, COATED/COV W/DEL SYS: HCPCS | Performed by: INTERNAL MEDICINE

## 2023-07-27 PROCEDURE — 93458 L HRT ARTERY/VENTRICLE ANGIO: CPT | Performed by: INTERNAL MEDICINE

## 2023-07-27 PROCEDURE — 92978 ENDOLUMINL IVUS OCT C 1ST: CPT | Performed by: INTERNAL MEDICINE

## 2023-07-27 PROCEDURE — C9600 PERC DRUG-EL COR STENT SING: HCPCS | Performed by: INTERNAL MEDICINE

## 2023-07-27 PROCEDURE — 25010000002 MIDAZOLAM PER 1 MG: Performed by: INTERNAL MEDICINE

## 2023-07-27 PROCEDURE — C1725 CATH, TRANSLUMIN NON-LASER: HCPCS | Performed by: INTERNAL MEDICINE

## 2023-07-27 PROCEDURE — C1753 CATH, INTRAVAS ULTRASOUND: HCPCS | Performed by: INTERNAL MEDICINE

## 2023-07-27 PROCEDURE — C1769 GUIDE WIRE: HCPCS | Performed by: INTERNAL MEDICINE

## 2023-07-27 PROCEDURE — C1894 INTRO/SHEATH, NON-LASER: HCPCS | Performed by: INTERNAL MEDICINE

## 2023-07-27 PROCEDURE — 25010000002 NITROGLYCERIN 100-5 MCG/ML-% SOLUTION: Performed by: INTERNAL MEDICINE

## 2023-07-27 PROCEDURE — C1887 CATHETER, GUIDING: HCPCS | Performed by: INTERNAL MEDICINE

## 2023-07-27 PROCEDURE — 25010000002 FENTANYL CITRATE (PF) 50 MCG/ML SOLUTION: Performed by: INTERNAL MEDICINE

## 2023-07-27 PROCEDURE — 25010000002 HEPARIN (PORCINE) PER 1000 UNITS: Performed by: INTERNAL MEDICINE

## 2023-07-27 PROCEDURE — 25010000002 KETOROLAC TROMETHAMINE PER 15 MG: Performed by: PHYSICIAN ASSISTANT

## 2023-07-27 PROCEDURE — 25510000001 IOPAMIDOL PER 1 ML: Performed by: INTERNAL MEDICINE

## 2023-07-27 PROCEDURE — 80061 LIPID PANEL: CPT | Performed by: PHYSICIAN ASSISTANT

## 2023-07-27 PROCEDURE — G0378 HOSPITAL OBSERVATION PER HR: HCPCS

## 2023-07-27 PROCEDURE — 92928 PRQ TCAT PLMT NTRAC ST 1 LES: CPT | Performed by: INTERNAL MEDICINE

## 2023-07-27 PROCEDURE — 99152 MOD SED SAME PHYS/QHP 5/>YRS: CPT | Performed by: INTERNAL MEDICINE

## 2023-07-27 PROCEDURE — 80050 GENERAL HEALTH PANEL: CPT | Performed by: PHYSICIAN ASSISTANT

## 2023-07-27 DEVICE — XIENCE SKYPOINT™ EVEROLIMUS ELUTING CORONARY STENT SYSTEM 3.00 MM X 18 MM / RAPID-EXCHANGE
Type: IMPLANTABLE DEVICE | Status: FUNCTIONAL
Brand: XIENCE SKYPOINT™

## 2023-07-27 RX ORDER — SODIUM CHLORIDE 9 MG/ML
100 INJECTION, SOLUTION INTRAVENOUS CONTINUOUS
Status: DISCONTINUED | OUTPATIENT
Start: 2023-07-27 | End: 2023-07-28 | Stop reason: HOSPADM

## 2023-07-27 RX ORDER — LISINOPRIL 2.5 MG/1
5 TABLET ORAL DAILY
Status: DISCONTINUED | OUTPATIENT
Start: 2023-07-27 | End: 2023-07-28 | Stop reason: HOSPADM

## 2023-07-27 RX ORDER — ASPIRIN 81 MG/1
81 TABLET ORAL DAILY
Status: DISCONTINUED | OUTPATIENT
Start: 2023-07-27 | End: 2023-07-28 | Stop reason: HOSPADM

## 2023-07-27 RX ORDER — KETOROLAC TROMETHAMINE 30 MG/ML
15 INJECTION, SOLUTION INTRAMUSCULAR; INTRAVENOUS ONCE
Status: COMPLETED | OUTPATIENT
Start: 2023-07-28 | End: 2023-07-27

## 2023-07-27 RX ORDER — LIDOCAINE 50 MG/G
2 PATCH TOPICAL
Status: DISCONTINUED | OUTPATIENT
Start: 2023-07-28 | End: 2023-07-28 | Stop reason: HOSPADM

## 2023-07-27 RX ORDER — VERAPAMIL HYDROCHLORIDE 2.5 MG/ML
INJECTION, SOLUTION INTRAVENOUS
Status: DISCONTINUED | OUTPATIENT
Start: 2023-07-27 | End: 2023-07-27 | Stop reason: HOSPADM

## 2023-07-27 RX ORDER — EPTIFIBATIDE 20 MG/10ML
INJECTION INTRAVENOUS
Status: DISCONTINUED | OUTPATIENT
Start: 2023-07-27 | End: 2023-07-27 | Stop reason: HOSPADM

## 2023-07-27 RX ORDER — NITROGLYCERIN 0.4 MG/1
0.4 TABLET SUBLINGUAL
Status: DISCONTINUED | OUTPATIENT
Start: 2023-07-27 | End: 2023-07-28 | Stop reason: HOSPADM

## 2023-07-27 RX ORDER — MIDAZOLAM HYDROCHLORIDE 1 MG/ML
INJECTION INTRAMUSCULAR; INTRAVENOUS
Status: DISCONTINUED | OUTPATIENT
Start: 2023-07-27 | End: 2023-07-27 | Stop reason: HOSPADM

## 2023-07-27 RX ORDER — SODIUM CHLORIDE 9 MG/ML
INJECTION, SOLUTION INTRAVENOUS
Status: COMPLETED | OUTPATIENT
Start: 2023-07-27 | End: 2023-07-27

## 2023-07-27 RX ORDER — LIDOCAINE HYDROCHLORIDE 20 MG/ML
INJECTION, SOLUTION INFILTRATION; PERINEURAL
Status: DISCONTINUED | OUTPATIENT
Start: 2023-07-27 | End: 2023-07-27 | Stop reason: HOSPADM

## 2023-07-27 RX ORDER — ATORVASTATIN CALCIUM 40 MG/1
40 TABLET, FILM COATED ORAL NIGHTLY
Status: DISCONTINUED | OUTPATIENT
Start: 2023-07-27 | End: 2023-07-28 | Stop reason: HOSPADM

## 2023-07-27 RX ORDER — HEPARIN SODIUM 1000 [USP'U]/ML
INJECTION, SOLUTION INTRAVENOUS; SUBCUTANEOUS
Status: DISCONTINUED | OUTPATIENT
Start: 2023-07-27 | End: 2023-07-27 | Stop reason: HOSPADM

## 2023-07-27 RX ORDER — NICOTINE 21 MG/24HR
1 PATCH, TRANSDERMAL 24 HOURS TRANSDERMAL EVERY 24 HOURS
Status: DISCONTINUED | OUTPATIENT
Start: 2023-07-27 | End: 2023-07-28 | Stop reason: HOSPADM

## 2023-07-27 RX ORDER — FENTANYL CITRATE 50 UG/ML
INJECTION, SOLUTION INTRAMUSCULAR; INTRAVENOUS
Status: DISCONTINUED | OUTPATIENT
Start: 2023-07-27 | End: 2023-07-27 | Stop reason: HOSPADM

## 2023-07-27 RX ORDER — METHOCARBAMOL 750 MG/1
750 TABLET, FILM COATED ORAL 3 TIMES DAILY
Status: DISCONTINUED | OUTPATIENT
Start: 2023-07-27 | End: 2023-07-28 | Stop reason: HOSPADM

## 2023-07-27 RX ORDER — FERROUS SULFATE 325(65) MG
325 TABLET ORAL
COMMUNITY

## 2023-07-27 RX ORDER — ACETAMINOPHEN 325 MG/1
650 TABLET ORAL EVERY 4 HOURS PRN
Status: DISCONTINUED | OUTPATIENT
Start: 2023-07-27 | End: 2023-07-28 | Stop reason: HOSPADM

## 2023-07-27 RX ADMIN — ASPIRIN 81 MG: 81 TABLET, COATED ORAL at 15:56

## 2023-07-27 RX ADMIN — NICOTINE TRANSDERMAL SYSTEM 1 PATCH: 21 PATCH, EXTENDED RELEASE TRANSDERMAL at 15:57

## 2023-07-27 RX ADMIN — KETOROLAC TROMETHAMINE 15 MG: 30 INJECTION, SOLUTION INTRAMUSCULAR; INTRAVENOUS at 23:53

## 2023-07-27 RX ADMIN — METHOCARBAMOL 750 MG: 750 TABLET, FILM COATED ORAL at 22:13

## 2023-07-27 RX ADMIN — SODIUM CHLORIDE 100 ML/HR: 9 INJECTION, SOLUTION INTRAVENOUS at 15:30

## 2023-07-27 RX ADMIN — TICAGRELOR 90 MG: 90 TABLET ORAL at 23:52

## 2023-07-27 RX ADMIN — ATORVASTATIN CALCIUM 40 MG: 40 TABLET, FILM COATED ORAL at 22:12

## 2023-07-27 RX ADMIN — ACETAMINOPHEN 650 MG: 325 TABLET ORAL at 22:15

## 2023-07-27 RX ADMIN — LISINOPRIL 5 MG: 2.5 TABLET ORAL at 17:37

## 2023-07-27 NOTE — PLAN OF CARE
Goal Outcome Evaluation:         Pt had a non eventful day. He received one stent. He has a TR band that he still has on d/t trickle of blood. He has sat up on side of bed for a majority of time. No s/s of distress noted. Pt ambulated in room with no s/s.Will continue to follow plan of care.

## 2023-07-27 NOTE — H&P (VIEW-ONLY)
History of Present Illness:          Chief Complaint   Patient presents with    Follow-up       Echo and stress findings    Shortness of Breath       Routine activity    Med Management       verbal         Rico Loaiza is a pleasant 47 y.o. male with a past medical history significant for tobacco abuse with roughly a 50-75-pack-year history, nonhypertensive, nondiabetic.  He does have dyslipidemia.  He comes in today for cardiology follow-up.     Dario did have stress test and echocardiogram stress test revealed normal myocardial fusion with no evidence of ischemia.  Echocardiogram showed normal LVEF with no significant valvular abnormalities.  Clinically he still reports chest tightness/pressure several times weekly that last for several minutes in duration.  He denies any known aggravating or alleviating factors.  He denies any worsening shortness of breath from baseline.          Allergies   Allergen Reactions    Diphenhydramine Delirium   :        Current Outpatient Medications:     aspirin 81 MG EC tablet, Take 1 tablet by mouth Daily., Disp: 30 tablet, Rfl: 2    methocarbamol (ROBAXIN) 750 MG tablet, Take 1 tablet by mouth 3 (Three) Times a Day., Disp: , Rfl:     Misc Natural Products (NF FORMULAS TESTOSTERONE) capsule, Take  by mouth., Disp: , Rfl:     naproxen (NAPROSYN) 500 MG tablet, Take 1 tablet by mouth Take As Directed., Disp: , Rfl:     nicotine (NICODERM CQ) 21 MG/24HR patch, Take As Directed., Disp: , Rfl:     Omega-3 Fatty Acids (fish oil) 1000 MG capsule capsule, Take 2 capsules by mouth Every 12 (Twelve) Hours., Disp: , Rfl:      The following portions of the patient's history were reviewed and updated as appropriate: allergies, current medications, past family history, past medical history, past social history, past surgical history and problem list.     Social History            Tobacco Use    Smoking status: Every Day       Packs/day: 2.00       Years: 15.00       Pack years: 30.00        "Types: Cigarettes    Smokeless tobacco: Never   Substance Use Topics    Alcohol use: Never    Drug use: Yes       Types: Marijuana                  Objective      Vitals       Vitals:     06/08/23 1352   BP: 132/78   Pulse: 60   Resp: 16   SpO2: 100%   Weight: 62.9 kg (138 lb 9.6 oz)   Height: 167.6 cm (66\")         Body mass index is 22.37 kg/mý.     Constitutional:       General: Not in acute distress.     Appearance: Healthy appearance. Well-developed and not in distress. Not diaphoretic.   Eyes:      Conjunctiva/sclera: Conjunctivae normal.      Pupils: Pupils are equal, round, and reactive to light.   HENT:      Head: Normocephalic and atraumatic.   Neck:      Vascular: No carotid bruit or JVD.   Pulmonary:      Effort: Pulmonary effort is normal. No respiratory distress.      Breath sounds: Normal breath sounds.   Cardiovascular:      Normal rate. Regular rhythm.   Edema:     Peripheral edema absent.   Skin:     General: Skin is cool.   Neurological:      Mental Status: Alert, oriented to person, place, and time and oriented to person, place and time.               Lab Results   Component Value Date      06/12/2019     K 3.9 06/12/2019      06/12/2019     CO2 26.1 06/12/2019     BUN 10 06/12/2019     CREATININE 1.09 06/12/2019     GLUCOSE 71 06/12/2019     CALCIUM 9.6 06/12/2019     AST 68 (H) 07/31/2019      (H) 07/31/2019     ALKPHOS 104 07/31/2019      No results found for: CKTOTAL        Lab Results   Component Value Date     WBC 8.29 06/12/2019     HGB 14.1 06/12/2019     HCT 41.5 06/12/2019      06/12/2019            Lab Results   Component Value Date     INR 0.89 (L) 06/12/2019      No results found for: MG  No results found for: TSH, PSA, CHLPL, TRIG, HDL, LDL   No results found for: BNP     During this visit the following were done:  Labs Reviewed []    Labs Ordered []    Radiology Reports Reviewed []    Radiology Ordered []    PCP Records Reviewed []    Referring Provider " Records Reviewed []    ER Records Reviewed []    Hospital Records Reviewed []    History Obtained From Family []    Radiology Images Reviewed []    Other Reviewed []    Records Requested []       Procedures           Assessment & Plan       Diagnosis Plan   1. Precordial pain  Comprehensive Metabolic Panel     Lipid Panel     CBC (No Diff)     CT Angiogram Coronary     TSH                      Recommendations:  Precordial pain  Given persistent chest pains will order CT coronary angiogram for further evaluation.  Check CMP, lipid panel, and TSH and CBC  Discussed results of stress test and echocardiogram.     Patient's a CT coronary angiogram was abnormal for CAD.  Pertinent positive ROS documented in HPI  Physical exam unremarkable\    I have explained the risks associated with the procedure to the patient including but not limited to an allergic reaction to the contrast material or medications used during the procedure bleeding, infection, and bruising at the catheter insertion site blood clots, which may trigger heart attack, stroke,   damage to the artery where the catheter was inserted, or damage to the arteries as the catheter travels through your body, irregular heart rhythm arrhythmias, kidney damage caused by the contrast material.

## 2023-07-27 NOTE — H&P
History of Present Illness:          Chief Complaint   Patient presents with    Follow-up       Echo and stress findings    Shortness of Breath       Routine activity    Med Management       verbal         Rico Loaiza is a pleasant 47 y.o. male with a past medical history significant for tobacco abuse with roughly a 50-75-pack-year history, nonhypertensive, nondiabetic.  He does have dyslipidemia.  He comes in today for cardiology follow-up.     Dario did have stress test and echocardiogram stress test revealed normal myocardial fusion with no evidence of ischemia.  Echocardiogram showed normal LVEF with no significant valvular abnormalities.  Clinically he still reports chest tightness/pressure several times weekly that last for several minutes in duration.  He denies any known aggravating or alleviating factors.  He denies any worsening shortness of breath from baseline.          Allergies   Allergen Reactions    Diphenhydramine Delirium   :        Current Outpatient Medications:     aspirin 81 MG EC tablet, Take 1 tablet by mouth Daily., Disp: 30 tablet, Rfl: 2    methocarbamol (ROBAXIN) 750 MG tablet, Take 1 tablet by mouth 3 (Three) Times a Day., Disp: , Rfl:     Misc Natural Products (NF FORMULAS TESTOSTERONE) capsule, Take  by mouth., Disp: , Rfl:     naproxen (NAPROSYN) 500 MG tablet, Take 1 tablet by mouth Take As Directed., Disp: , Rfl:     nicotine (NICODERM CQ) 21 MG/24HR patch, Take As Directed., Disp: , Rfl:     Omega-3 Fatty Acids (fish oil) 1000 MG capsule capsule, Take 2 capsules by mouth Every 12 (Twelve) Hours., Disp: , Rfl:      The following portions of the patient's history were reviewed and updated as appropriate: allergies, current medications, past family history, past medical history, past social history, past surgical history and problem list.     Social History            Tobacco Use    Smoking status: Every Day       Packs/day: 2.00       Years: 15.00       Pack years: 30.00        "Types: Cigarettes    Smokeless tobacco: Never   Substance Use Topics    Alcohol use: Never    Drug use: Yes       Types: Marijuana                  Objective      Vitals       Vitals:     06/08/23 1352   BP: 132/78   Pulse: 60   Resp: 16   SpO2: 100%   Weight: 62.9 kg (138 lb 9.6 oz)   Height: 167.6 cm (66\")         Body mass index is 22.37 kg/m².     Constitutional:       General: Not in acute distress.     Appearance: Healthy appearance. Well-developed and not in distress. Not diaphoretic.   Eyes:      Conjunctiva/sclera: Conjunctivae normal.      Pupils: Pupils are equal, round, and reactive to light.   HENT:      Head: Normocephalic and atraumatic.   Neck:      Vascular: No carotid bruit or JVD.   Pulmonary:      Effort: Pulmonary effort is normal. No respiratory distress.      Breath sounds: Normal breath sounds.   Cardiovascular:      Normal rate. Regular rhythm.   Edema:     Peripheral edema absent.   Skin:     General: Skin is cool.   Neurological:      Mental Status: Alert, oriented to person, place, and time and oriented to person, place and time.               Lab Results   Component Value Date      06/12/2019     K 3.9 06/12/2019      06/12/2019     CO2 26.1 06/12/2019     BUN 10 06/12/2019     CREATININE 1.09 06/12/2019     GLUCOSE 71 06/12/2019     CALCIUM 9.6 06/12/2019     AST 68 (H) 07/31/2019      (H) 07/31/2019     ALKPHOS 104 07/31/2019      No results found for: CKTOTAL        Lab Results   Component Value Date     WBC 8.29 06/12/2019     HGB 14.1 06/12/2019     HCT 41.5 06/12/2019      06/12/2019            Lab Results   Component Value Date     INR 0.89 (L) 06/12/2019      No results found for: MG  No results found for: TSH, PSA, CHLPL, TRIG, HDL, LDL   No results found for: BNP     During this visit the following were done:  Labs Reviewed []    Labs Ordered []    Radiology Reports Reviewed []    Radiology Ordered []    PCP Records Reviewed []    Referring Provider " Records Reviewed []    ER Records Reviewed []    Hospital Records Reviewed []    History Obtained From Family []    Radiology Images Reviewed []    Other Reviewed []    Records Requested []       Procedures           Assessment & Plan       Diagnosis Plan   1. Precordial pain  Comprehensive Metabolic Panel     Lipid Panel     CBC (No Diff)     CT Angiogram Coronary     TSH                      Recommendations:  Precordial pain  Given persistent chest pains will order CT coronary angiogram for further evaluation.  Check CMP, lipid panel, and TSH and CBC  Discussed results of stress test and echocardiogram.     Patient's a CT coronary angiogram was abnormal for CAD.  Pertinent positive ROS documented in HPI  Physical exam unremarkable\    I have explained the risks associated with the procedure to the patient including but not limited to an allergic reaction to the contrast material or medications used during the procedure bleeding, infection, and bruising at the catheter insertion site blood clots, which may trigger heart attack, stroke,   damage to the artery where the catheter was inserted, or damage to the arteries as the catheter travels through your body, irregular heart rhythm arrhythmias, kidney damage caused by the contrast material.

## 2023-07-28 VITALS
RESPIRATION RATE: 20 BRPM | BODY MASS INDEX: 21.74 KG/M2 | SYSTOLIC BLOOD PRESSURE: 143 MMHG | HEART RATE: 65 BPM | OXYGEN SATURATION: 98 % | HEIGHT: 66 IN | WEIGHT: 135.3 LBS | DIASTOLIC BLOOD PRESSURE: 80 MMHG | TEMPERATURE: 97.5 F

## 2023-07-28 LAB
ANION GAP SERPL CALCULATED.3IONS-SCNC: 8 MMOL/L (ref 5–15)
BUN SERPL-MCNC: 9 MG/DL (ref 6–20)
BUN/CREAT SERPL: 9.9 (ref 7–25)
CALCIUM SPEC-SCNC: 8.5 MG/DL (ref 8.6–10.5)
CHLORIDE SERPL-SCNC: 102 MMOL/L (ref 98–107)
CHOLEST SERPL-MCNC: 105 MG/DL (ref 0–200)
CO2 SERPL-SCNC: 22 MMOL/L (ref 22–29)
CREAT SERPL-MCNC: 0.91 MG/DL (ref 0.76–1.27)
DEPRECATED RDW RBC AUTO: 42.7 FL (ref 37–54)
EGFRCR SERPLBLD CKD-EPI 2021: 104.6 ML/MIN/1.73
ERYTHROCYTE [DISTWIDTH] IN BLOOD BY AUTOMATED COUNT: 12.5 % (ref 12.3–15.4)
GLUCOSE SERPL-MCNC: 97 MG/DL (ref 65–99)
HBA1C MFR BLD: 5.7 % (ref 4.8–5.6)
HCT VFR BLD AUTO: 35.9 % (ref 37.5–51)
HDLC SERPL-MCNC: 42 MG/DL (ref 40–60)
HGB BLD-MCNC: 12.3 G/DL (ref 13–17.7)
LDLC SERPL CALC-MCNC: 49 MG/DL (ref 0–100)
LDLC/HDLC SERPL: 1.19 {RATIO}
MCH RBC QN AUTO: 31.8 PG (ref 26.6–33)
MCHC RBC AUTO-ENTMCNC: 34.3 G/DL (ref 31.5–35.7)
MCV RBC AUTO: 92.8 FL (ref 79–97)
PLATELET # BLD AUTO: 256 10*3/MM3 (ref 140–450)
PMV BLD AUTO: 9.1 FL (ref 6–12)
POTASSIUM SERPL-SCNC: 3.9 MMOL/L (ref 3.5–5.2)
RBC # BLD AUTO: 3.87 10*6/MM3 (ref 4.14–5.8)
SODIUM SERPL-SCNC: 132 MMOL/L (ref 136–145)
TRIGL SERPL-MCNC: 65 MG/DL (ref 0–150)
VLDLC SERPL-MCNC: 14 MG/DL (ref 5–40)
WBC NRBC COR # BLD: 8.59 10*3/MM3 (ref 3.4–10.8)

## 2023-07-28 PROCEDURE — 99213 OFFICE O/P EST LOW 20 MIN: CPT | Performed by: INTERNAL MEDICINE

## 2023-07-28 PROCEDURE — 94799 UNLISTED PULMONARY SVC/PX: CPT

## 2023-07-28 PROCEDURE — 83036 HEMOGLOBIN GLYCOSYLATED A1C: CPT | Performed by: INTERNAL MEDICINE

## 2023-07-28 PROCEDURE — 80061 LIPID PANEL: CPT | Performed by: INTERNAL MEDICINE

## 2023-07-28 PROCEDURE — 80048 BASIC METABOLIC PNL TOTAL CA: CPT | Performed by: INTERNAL MEDICINE

## 2023-07-28 PROCEDURE — G0378 HOSPITAL OBSERVATION PER HR: HCPCS

## 2023-07-28 PROCEDURE — 85027 COMPLETE CBC AUTOMATED: CPT | Performed by: INTERNAL MEDICINE

## 2023-07-28 RX ORDER — ACETAMINOPHEN 325 MG/1
650 TABLET ORAL EVERY 4 HOURS PRN
Qty: 90 TABLET | Refills: 1 | Status: SHIPPED | OUTPATIENT
Start: 2023-07-28

## 2023-07-28 RX ORDER — NITROGLYCERIN 0.4 MG/1
0.4 TABLET SUBLINGUAL
Qty: 25 TABLET | Refills: 12 | Status: SHIPPED | OUTPATIENT
Start: 2023-07-28

## 2023-07-28 RX ORDER — ATORVASTATIN CALCIUM 40 MG/1
40 TABLET, FILM COATED ORAL NIGHTLY
Qty: 90 TABLET | Refills: 3 | Status: SHIPPED | OUTPATIENT
Start: 2023-07-28

## 2023-07-28 RX ORDER — ASPIRIN 81 MG/1
81 TABLET ORAL DAILY
Qty: 90 TABLET | Refills: 3 | Status: SHIPPED | OUTPATIENT
Start: 2023-07-29

## 2023-07-28 RX ORDER — LISINOPRIL 5 MG/1
5 TABLET ORAL DAILY
Qty: 90 TABLET | Refills: 3 | Status: SHIPPED | OUTPATIENT
Start: 2023-07-29

## 2023-07-28 RX ADMIN — LIDOCAINE 2 PATCH: 50 PATCH TOPICAL at 10:09

## 2023-07-28 RX ADMIN — LISINOPRIL 5 MG: 2.5 TABLET ORAL at 10:08

## 2023-07-28 RX ADMIN — ASPIRIN 81 MG: 81 TABLET, COATED ORAL at 10:08

## 2023-07-28 RX ADMIN — TICAGRELOR 90 MG: 90 TABLET ORAL at 11:55

## 2023-07-28 RX ADMIN — SODIUM CHLORIDE 100 ML/HR: 9 INJECTION, SOLUTION INTRAVENOUS at 02:47

## 2023-07-28 NOTE — ACP (ADVANCE CARE PLANNING)
assisted patient in completing Living Will naming son Kieran as healthcare surrogate and mother Dorita as alternate as well as specifying his choices regarding life prolonging treatment.  Original to medical records.

## 2023-07-28 NOTE — DISCHARGE SUMMARY
"Patient Identification  Name:  Rico Loaiza  Age:  47 y.o.  Sex:  male  :  1975  MRN:  [id number]  Visit Number:  [id number]    Date of Admission: 2023  Date of Discharge:     PCP: Fany Rocha, JANAY    DISCHARGE DIAGNOSIS  Coronary artery disease with mid LAD 80% stenosis status post PCI VITO  Diastolic dysfunction with LVEDP of 21 mmHg    CONSULTS   Cardiac rehabilitation    PROCEDURES PERFORMED  Selective right and left coronary angiogram  Left heart catheterization  Left ventriculography  PCI VITO of the LAD  IVUS of LAD  Moderate stenosis in RCA will need staged FFR based PCI    HOSPITAL COURSE  Patient is a 47 y.o. male with a past medical history significant for coronary artery disease, hyperlipidemia, infectious viral hepatitis, and tobacco use.  He presented for outpatient cardiac catheterization for abnormal coronary CT angiogram on 2023 and underwent successful PCI VITO to the LAD.  Patient will need staged intervention to the RCA.  Following the procedure he was transferred to the major unit where his vital signs were monitored, right radial access site was monitored, and he was placed on guideline directed medical therapy for coronary artery disease.    Please see the admitting history and physical for further details.    Patient is to be discharged home on this date.  He has been placed on guideline directed medical therapy including aspirin, Brilinta, atorvastatin, and lisinopril.  He will follow-up in our office next week.  He has been counseled regarding smoking cessation, cardiac rehabilitation.    CONDITION ON DISCHARGE  Stable.    VITAL SIGNS  /80 (BP Location: Left arm, Patient Position: Lying)   Pulse 65   Temp 97.5 °F (36.4 °C) (Oral)   Resp 20   Ht 167.6 cm (66\")   Wt 61.4 kg (135 lb 4.8 oz)   SpO2 98%   BMI 21.84 kg/m²     DISCHARGE PHYSICAL EXAM  Vitals reviewed.   Constitutional:       Appearance: Well-developed.   Neck:      Vascular: " No carotid bruit or JVD.   Pulmonary:      Effort: Pulmonary effort is normal. No respiratory distress.      Breath sounds: Normal breath sounds. No wheezing. No rales.   Cardiovascular:      Normal rate. Regular rhythm.      Comments: Right radial access site is without erythema or ecchymosis.  Pulse intact.  No lower extremity edema  Skin:     General: Skin is warm and dry.   Neurological:      Mental Status: Alert and oriented to person, place, and time.       RESULTS REVIEW    DISCHARGE DISPOSITION   Home or Self Care    DISCHARGE MEDICATIONS     Discharge Medications        New Medications        Instructions Start Date   acetaminophen 325 MG tablet  Commonly known as: TYLENOL   650 mg, Oral, Every 4 Hours PRN      atorvastatin 40 MG tablet  Commonly known as: LIPITOR   40 mg, Oral, Nightly      Brilinta 90 MG tablet tablet  Generic drug: ticagrelor   90 mg, Oral, 2 Times Daily      lisinopril 5 MG tablet  Commonly known as: PRINIVIL,ZESTRIL   5 mg, Oral, Daily   Start Date: July 29, 2023     nitroglycerin 0.4 MG SL tablet  Commonly known as: NITROSTAT   Place 1 tablet under the tongue Every 5 minutes as needed for Chest Pain (Systolic blood pressure greater than 100). Take no more than 3 doses in 15 minutes.             Continue These Medications        Instructions Start Date   Aspirin Low Dose 81 MG EC tablet  Generic drug: aspirin   81 mg, Oral, Daily   Start Date: July 29, 2023     ferrous sulfate 325 (65 FE) MG tablet   325 mg, Oral, Daily With Breakfast             Stop These Medications      fish oil 1000 MG capsule capsule     naproxen 500 MG tablet  Commonly known as: NAPROSYN              Diet Instructions    As tolerated            Activity Instructions    As tolerated           Your Scheduled Appointments      Aug 01, 2023  1:30 PM  Follow Up with Felton Mcgovern MD  [facility] CARDIOLOGY [address] [address]  [phone]   -Bring  photo ID, insurance card, and list of medications to appointment  -If testing was completed outside of [facility] then patient must bring images on a disc  -Copay will be collected at time of appointment  -Established patients should arrive at time of appointment         Sep 08, 2023 11:15 AM  Follow Up with Bob Maldonado PA-C  [facility] MEDICAL [facility] CARDIOLOGY ([place]) [address] [place] [id number] [phone]   -Bring photo ID, insurance card, and list of medications to appointment  -If testing was completed outside of [facility] then patient must bring images on a disc  -Copay will be collected at time of appointment  -Established patients should arrive 10 minutes prior to appointment                    TEST  RESULTS PENDING AT DISCHARGE         Time: greater than 30 minutes.    Electronically signed by JANAY Brown, 07/28/23, 2:59 PM EDT.

## 2023-07-28 NOTE — CASE MANAGEMENT/SOCIAL WORK
Discharge Planning Assessment   Jc     Patient Name: Rico Loaiza  MRN: [id number]  Today's Date: 7/28/2023    Admit Date: 7/27/2023    Plan: CM spoke with patient at bedside. A/O. Plans to return home upon dc living with his mother, Son, Kieran and his family. Independent at baseline. States his family supports him and does not have DME or Home Health needs. His son, Kieran will provide transport home on dc date. His PCP is Fany Rocha. He has [facility] Medicaid coverage and does not have copay concerns. [facility] is his preferred Pharmacy. Denies needs or concerns on this date.CM will follow and assist as needed.   Discharge Needs Assessment       Row Name 07/28/23 0853       Living Environment    People in Home child(linda), adult;parent(s)    Name(s) of People in Home Lives with mother, son, Kieran and his family.    Current Living Arrangements home    Primary Care Provided by self    Provides Primary Care For no one    Family Caregiver if Needed child(linda), adult    Quality of Family Relationships unable to assess    Able to Return to Prior Arrangements yes       Resource/Environmental Concerns    Resource/Environmental Concerns none    Transportation Concerns none       Transition Planning    Patient/Family Anticipates Transition to home with family    Patient/Family Anticipated Services at Transition none    Transportation Anticipated family or friend will provide       Discharge Needs Assessment    Readmission Within the Last 30 Days no previous admission in last 30 days    Current Outpatient/Agency/Support Group --  Independent. Does not utilize HH.    Concerns to be Addressed no discharge needs identified;denies needs/concerns at this time    Equipment Needed After Discharge none    Outpatient/Agency/Support Group Needs --  Does not utilize HH. Denies needs.    Discharge Facility/Level of Care Needs --  Home at dc.    Current Discharge Risk --  Denies concerns.                   Discharge  Plan       Row Name 07/28/23 0904       Plan    Plan CM spoke with patient at bedside. A/O. Plans to return home upon dc living with his mother, Son, Kieran and his family. Independent at baseline. States his family supports him and does not have DME or Home Health needs. His son, Kieran will provide transport home on dc date. His PCP is Fany Rocha. He has [facility] Medicaid coverage and does not have copay concerns. [facility] is his preferred [facility]. Denies needs or concerns on this date.CM will follow and assist as needed.    Patient/Family in Agreement with Plan yes                                                                                                                                                                                                                                                                                           Shonna Bravo RN

## 2023-07-28 NOTE — NURSING NOTE
Pt being discharged home today on room air. Family to provide transportation. CATE Nolasco made aware the discharge is complete.

## 2023-07-28 NOTE — NURSING NOTE
Order received for Cardiac Rehab Consultation.     Patient stated he lives in [place] and does not want to drive here two to three days a week. He stated gas was too high. I explained [facility] [place] also has a Cardiac Rehab and he stated if he wants to do it he will call them himself.        Information discussed with: Patient        Educated on: Benefits of Exercise,  Educated on Cardiac Rehab and Program Protocol, Brochure and/or educational material provided, Contact information given, and Teach Back Verified          Thank you for the referral. Please contact the Cardiac Rehab Dept. (ext. [phone]) with any further questions or concerns.

## 2023-07-28 NOTE — PLAN OF CARE
Goal Outcome Evaluation:  Plan of Care Reviewed With: patient        Progress: no change  Outcome Evaluation: 1 ml air removed hourly from TR band from 2000 to 2210. TR banbd removed at 2305. no signs of bleeding. vitals stable throughout shift. Patient A&Ox4, pleasant. Complains of pain treated with PRN tylenol claims to be ineffective by patient. JONATHON House messaged and one time dose of Toradol administered.

## 2023-07-28 NOTE — PHARMACY PATIENT ASSISTANCE
Pharmacy checked on cost of new medication Brilinta. Per patient's insurance, copay will be $0.    Thank you,  Jaky Castelan, PharmD

## 2023-07-31 ENCOUNTER — TELEPHONE (OUTPATIENT)
Dept: TELEMETRY | Facility: HOSPITAL | Age: 48
End: 2023-07-31
Payer: COMMERCIAL

## 2023-08-01 ENCOUNTER — OFFICE VISIT (OUTPATIENT)
Dept: CARDIOLOGY | Facility: CLINIC | Age: 48
End: 2023-08-01
Payer: COMMERCIAL

## 2023-08-01 VITALS
HEART RATE: 66 BPM | OXYGEN SATURATION: 98 % | DIASTOLIC BLOOD PRESSURE: 62 MMHG | BODY MASS INDEX: 21.12 KG/M2 | SYSTOLIC BLOOD PRESSURE: 116 MMHG | WEIGHT: 131.4 LBS | HEIGHT: 66 IN

## 2023-08-01 DIAGNOSIS — R07.2 PRECORDIAL PAIN: ICD-10-CM

## 2023-08-01 DIAGNOSIS — E78.5 DYSLIPIDEMIA: Primary | ICD-10-CM

## 2023-08-01 PROCEDURE — 99214 OFFICE O/P EST MOD 30 MIN: CPT | Performed by: INTERNAL MEDICINE

## 2023-08-01 NOTE — PROGRESS NOTES
[facility] CARDIOLOGY  [address] [place] [address]  Phone: [phone]  Fax: [phone]    08/01/2023    Chief Complaint   Patient presents with    Shortness of Breath     Pt complains of SOA    Chest Pain     F/U    Follow-up     Pt denies CP, random SOA    Med Review     Tolerating all medications well.        History:     Rico Loaiza is a 47 y.o. male presenting for  follow-up evaluation   Clinically stable from cardiac standpoint   Symptoms:  CP no  SOB stable    Orthopnea No  Lower extremity edema no   Palpitations no   Compliant with medications yes  Claudication no      Past Medical History:   Diagnosis Date    CAD (coronary artery disease), native coronary artery 07/27/2023    Hyperlipidemia     Infectious viral hepatitis        Past Surgical History:   Procedure Laterality Date    CARDIAC CATHETERIZATION      CARDIAC CATHETERIZATION N/A 07/27/2023    Procedure: Coronary angiography;  Surgeon: Felton Mcgovern MD;  Location: [facility] CATH INVASIVE LOCATION;  Service: Cardiology;  Laterality: N/A;    CARDIAC CATHETERIZATION N/A 8/9/2023    Procedure: Percutaneous Coronary Intervention RCA;  Surgeon: Felton Mcgovern MD;  Location: [facility] CATH INVASIVE LOCATION;  Service: Cardiology;  Laterality: N/A;    CORONARY ANGIOPLASTY      HERNIA REPAIR      HERNIA REPAIR      INTERVENTIONAL RADIOLOGY PROCEDURE N/A 8/9/2023    Procedure: Intravascular Ultrasound;  Surgeon: Felton Mcgovern MD;  Location: [facility] CATH INVASIVE LOCATION;  Service: Cardiology;  Laterality: N/A;        Past Social History:  Social History     Socioeconomic History    Marital status: Single   Tobacco Use    Smoking status: Every Day     Packs/day: 2.00     Years: 15.00     Pack years: 30.00     Types: Cigarettes     Passive exposure: Current    Smokeless tobacco: Never   Vaping Use    Vaping Use: Never used   Substance and Sexual Activity     Alcohol use: Never    Drug use: Yes     Types: Marijuana    Sexual activity: Never       Past Family History:  Family History   Problem Relation Age of Onset    Hypertension Mother     Diabetes Mother     Cancer Mother     Irritable bowel syndrome Mother     Heart disease Father     Heart attack Father     Alcohol abuse Sister     COPD Paternal Aunt     Cancer Paternal Aunt     Alcohol abuse Paternal Uncle     Colon cancer Paternal Grandfather     Colon polyps Paternal Grandfather        Review of Systems:   Review of Systems   Constitutional: Negative for diaphoresis, fever, weight gain and weight loss.   HENT:  Negative for congestion, nosebleeds and sore throat.    Eyes:  Negative for blurred vision and visual disturbance.   Cardiovascular:  Positive for chest pain. Negative for claudication, dyspnea on exertion, irregular heartbeat, leg swelling, orthopnea, palpitations, paroxysmal nocturnal dyspnea and syncope.   Respiratory:  Negative for cough, hemoptysis, shortness of breath, sleep disturbances due to breathing, snoring, sputum production and wheezing.    Endocrine: Negative for cold intolerance, heat intolerance, polydipsia, polyphagia and polyuria.   Hematologic/Lymphatic: Negative for bleeding problem.   Skin:  Negative for dry skin, itching and rash.   Musculoskeletal:  Negative for muscle cramps, muscle weakness and myalgias.   Gastrointestinal:  Negative for abdominal pain, constipation, diarrhea, heartburn, hematemesis, hematochezia, hemorrhoids, melena, nausea and vomiting.   Genitourinary:  Negative for hematuria and nocturia.   Neurological:  Negative for excessive daytime sleepiness, dizziness, focal weakness, headaches, light-headedness, numbness, seizures, vertigo and weakness.   Psychiatric/Behavioral:  Negative for depression, substance abuse and suicidal ideas.    Allergic/Immunologic: Negative for environmental allergies.        Current Outpatient Medications   Medication Sig  "Dispense Refill    acetaminophen (TYLENOL) 325 MG tablet Take 2 tablets by mouth Every 4 (Four) Hours As Needed for Mild Pain or Fever (temperature greater than 101F). 90 tablet 1    aspirin 81 MG EC tablet Take 1 tablet by mouth Daily. 90 tablet 3    atorvastatin (LIPITOR) 40 MG tablet Take 1 tablet by mouth Every Night. 90 tablet 3    lisinopril (PRINIVIL,ZESTRIL) 5 MG tablet Take 1 tablet by mouth Daily. 90 tablet 3    nitroglycerin (NITROSTAT) 0.4 MG SL tablet Place 1 tablet under the tongue Every 5 minutes as needed for Chest Pain (Systolic blood pressure greater than 100). Take no more than 3 doses in 15 minutes. 25 tablet 12    ticagrelor (BRILINTA) 90 MG tablet tablet Take 1 tablet by mouth 2 (Two) Times a Day. 180 tablet 3    ferrous sulfate 325 (65 FE) MG tablet Take 1 tablet by mouth Daily With Breakfast.       No current facility-administered medications for this visit.        Allergies   Allergen Reactions    Diphenhydramine Delirium       Objective     /62   Pulse 66   Ht 167.6 cm (66\")   Wt 59.6 kg (131 lb 6.4 oz)   SpO2 98%   BMI 21.21 kg/mý     Physical Exam  Constitutional:       Appearance: He is well-developed.   HENT:      Head: Normocephalic and atraumatic.   Eyes:      Pupils: Pupils are equal, round, and reactive to light.   Cardiovascular:      Rate and Rhythm: Normal rate and regular rhythm.   Pulmonary:      Effort: Pulmonary effort is normal.      Breath sounds: Normal breath sounds.   Abdominal:      General: Bowel sounds are normal.      Palpations: Abdomen is soft.   Musculoskeletal:         General: Normal range of motion.      Cervical back: Normal range of motion and neck supple.   Skin:     General: Skin is warm and dry.      Capillary Refill: Capillary refill takes less than 2 seconds.   Neurological:      Mental Status: He is alert and oriented to person, place, and time.        DATA:   Results for orders placed during the hospital encounter of " 05/31/23    Adult Transthoracic Echo Complete W/ Cont if Necessary Per Protocol    Interpretation Summary    Normal left ventricular cavity size and wall thickness noted. All left ventricular wall segments contract normally.    Left ventricular ejection fraction appears to be 61 - 65%.    The aortic valve is not well visualized. No aortic valve regurgitation or stenosis is present. The aortic valve is grossly normal in structure.    The mitral valve is structurally normal with no significant stenosis present. Mild mitral valve regurgitation is present.    There is no evidence of pericardial effusion. .   Results for orders placed during the hospital encounter of 05/31/23    Stress Test With Myocardial Perfusion One Day    Interpretation Summary  Images from the original result were not included.      Stress Procedure    A stress test was performed following the [facility]ok protocol.    Exercise duration (min) 10 min Exercise duration (sec) 6 sec Estimated workload 13.4 METS    Baseline Vitals Baseline HR 57 bpm Baseline /63 mmHg Peak Stress Vitals Peak  bpm Peak /82 mmHg Recovery Vitals Recovery HR 66 bpm Recovery BP 94/55 mmHg Exercise Data Target HR (85%) 147 bpm Max. Pred. HR (100%) 173 bpm Percent Max Pred HR 85.55 %    Patient denied any chest discomfort during exercise,    There was no ST segment deviation noted during stress.    There were no significant arrhythmias noted during stress.    Findings consistent with a normal ECG stress test.    Nuclear Perfusion Findings    Myocardial perfusion imaging indicates a normal myocardial perfusion study with no evidence of ischemia.    TID:0.99    Normal LV cavity size. Normal LV wall motion noted.    Left ventricular ejection fraction is normal (Calculated EF = 69%).    Impressions are consistent with a low risk study.   Results for orders placed during the hospital encounter of 05/31/23    Stress Test With Myocardial Perfusion One  Day    Interpretation Summary  Images from the original result were not included.      Stress Procedure    A stress test was performed following the Ashok protocol.    Exercise duration (min) 10 min Exercise duration (sec) 6 sec Estimated workload 13.4 METS    Baseline Vitals Baseline HR 57 bpm Baseline /63 mmHg Peak Stress Vitals Peak  bpm Peak /82 mmHg Recovery Vitals Recovery HR 66 bpm Recovery BP 94/55 mmHg Exercise Data Target HR (85%) 147 bpm Max. Pred. HR (100%) 173 bpm Percent Max Pred HR 85.55 %    Patient denied any chest discomfort during exercise,    There was no ST segment deviation noted during stress.    There were no significant arrhythmias noted during stress.    Findings consistent with a normal ECG stress test.    Nuclear Perfusion Findings    Myocardial perfusion imaging indicates a normal myocardial perfusion study with no evidence of ischemia.    TID:0.99    Normal LV cavity size. Normal LV wall motion noted.    Left ventricular ejection fraction is normal (Calculated EF = 69%).    Impressions are consistent with a low risk study.   Results for orders placed during the hospital encounter of 07/27/23    Cardiac Catheterization/Vascular Study    Narrative  Images from the original result were not included.  CARDIAC CATHETERIZATION / INTERVENTION REPORT            DATE OF PROCEDURE: 7/27/2023      INDICATION FOR PROCEDURE: Abnormal Coronary CT angiogram        POST PROCEDURE DIAGNOSIS:  CAD wit mid LAD 80% stenosis s/p PCI with 3.0 x 18 mm VITO  Diastolic Dysfunction with LVEDP of 21 mmHg    Face to face mdoerate conscious  sedation time :      COMPLICATIONS : None    Specimens collected : None    PROCEDURE PERFORMED:    1. Selective right and left Coronary Angiogram  2. Left heart catheretization  3. Left Ventriculography  4. RFR of LAD - attempted but not performed due to malfunction system  5. PCI of LAD  6. IVUS of LAD    Description of the procedure:  Prior to  the procedure risk, benefits and possible alternative were discussed with the patient and informed consent was obtained. Patient was brought to cardiac cath lab table in post absorbtive state. Patient was prepped and drape in usual sterile fashion. IV Versed and Fentanyl was used for moderate sedation. 2% Lidocaine was used for topical anesthesia. R radial arterial site was prepped and a micropuncture needle was used to access the artery and a 5 F slender sheath was placed. 2.5 mg of Verapamil and 200 mcg of NTG was given through the sheath intra arterial and 5000 units of Heparin was given once the catheter crossed the aortic arch.    5 F TIG 4 catheters was used for right and left coronary angiogram and 5 F pigtail catheter was used for Left heart hemodynamics and left ventriculography. All the catheters were exchanged over 0.035 wire. The R radial arterial sheath was removed and TR band was applied and immediate and complete hemostasis was achieved. The patient tolerate the entire procedure well without any immediate known complications.    Coronary anatomy findings:    LM: Is a large calibre vessel , normal take off from left cusp, divides into LAD and Lcx. No stenosis    LAD:  Mid lad tubular stenosis after take off D1 artery, D1 itself had no stenosis, IVUS showed focal calcified eccentric stenosis just prior to D1 take off too, but angiographically it did not look significant so deferred PCI, very distal LAD had focal 60% stenosis and deferred stenosis since it very distal.    LCX: Moderate calibre vessel, proximal mild 40% stenosis, continues as large OM branch with no stenosis    RCA: Large calibre, dominant artery, normal take off from right cusp.  Entire mid RCA had long tandem 70% stenosis. RPL had no stenosis, R PDA is small calibre with no stenosis    Left Ventriculography:    LV systolic function was normal with visual estimated EF of 55%. No wall motion abnormalities.  No significant mitral  regurgitation noted.    LVEDP: 21 mmHg  No gradient across the aortic valve on pull back.      PERCUTANEOUS CORONARY INTERVENTION PROCEDURE NOTE:    Heparin monotherapy was used for anticoagulation.  Total of 7000 Units was given IV.    XB 3.5 guide was used to engage the lm coaxially.    0.014 Runthrough guidewire was used to cross the lesion and parked distally.    Used a 2.0 x 15 Compliant TREK balloon to predilate the lesion at 10 patricia.    IVUS measures distal vessel ref D of 3.0 mm    Prepped a 3.0 x 18 [employer] eluting stent and position at the lesion and successfully deployed at 16 patricia.    Post stent deployment angio pictures showed good expansion with 0% residual stenosis, CATALINA 3 flow in the distal vascular bed and no dissection or distal wire perforation.    Lesion length: 15 mm  Pre PCI Stenosis: 80 %  Post PCI stenosis: 0 %  Pre PCI CATALINA flow: 3  Post PCI CATALINA flow: 3    EBL: Less than 10cc      Final Impression:  CAD wit mid LAD 80% stenosis s/p PCI with 3.0 x 18 mm VITO  Diastolic Dysfunction with LVEDP of 21 mmHg      Recommendations:  Aggressive guideline directed medical management for CAD  Dual Anti platelet medication with Asa 81 mg qd and Brilinta 90 mg bid for minimum 1 year.  Will need staged intervention of RCA          Felton Mcgovern MD, [facility]  Interventional Cardiology    07/27/23  13:34 EDT      ECG 12 Lead    Date/Time: 8/4/2023 9:40 AM  Performed by: Felton Mcgovern MD  Authorized by: Felton Mcgovern MD   Rhythm: sinus rhythm  Other findings: non-specific ST-T wave changes    Clinical impression: abnormal EKG         Lab Results   Component Value Date    CHOL 104 08/10/2023    CHOL 105 07/28/2023    CHOL 118 07/27/2023     Lab Results   Component Value Date    TRIG 53 08/10/2023    TRIG 65 07/28/2023    TRIG 50 07/27/2023     Lab Results   Component Value Date    HDL 46 08/10/2023    HDL 42 07/28/2023    HDL 45 07/27/2023     Lab Results   Component Value Date     LDL 45 08/10/2023    LDL 49 07/28/2023    LDL 61 07/27/2023       Lab Results   Component Value Date    TSH 1.250 07/27/2023               Invalid input(s): LABALBU, PROT          Assessment and Plan    Assessment and Plan    Problem List Items Addressed This Visit          Cardiac and Vasculature    Precordial pain    Overview     Added automatically from request for surgery [id number]         Relevant Orders    Case Request Cath Lab: Percutaneous Coronary Intervention RCA (Completed)    CBC (No Diff) (Completed)    Basic Metabolic Panel (Completed)     Other Visit Diagnoses       Dyslipidemia    -  Primary          I have explained the risks associated with the procedure to the patient including but not limited to an allergic reaction to the contrast material or medications used during the procedure bleeding, infection, and bruising at the catheter insertion site blood clots, which may trigger heart attack, stroke,   damage to the artery where the catheter was inserted, or damage to the arteries as the catheter travels through your body, irregular heart rhythm arrhythmias, kidney damage caused by the contrast material.        Recommended increase activity to 30 minutes of walking daily, most days of the week    Thank you for allowing me to participate in the care of Rico Loaiza. Feel free to contact me directly with any further questions or concerns.          Felton Mcgovern MD, FACC  Interventional Cardiology

## 2023-08-04 PROCEDURE — 93000 ELECTROCARDIOGRAM COMPLETE: CPT | Performed by: INTERNAL MEDICINE

## 2023-08-09 ENCOUNTER — HOSPITAL ENCOUNTER (OUTPATIENT)
Facility: HOSPITAL | Age: 48
Discharge: HOME | End: 2023-08-10
Attending: INTERNAL MEDICINE | Admitting: INTERNAL MEDICINE
Payer: COMMERCIAL

## 2023-08-09 DIAGNOSIS — R07.2 PRECORDIAL PAIN: ICD-10-CM

## 2023-08-09 LAB
ACT BLD: 263 SECONDS (ref 82–152)
ANION GAP SERPL CALCULATED.3IONS-SCNC: 8.5 MMOL/L (ref 5–15)
BUN SERPL-MCNC: 15 MG/DL (ref 6–20)
BUN/CREAT SERPL: 15.6 (ref 7–25)
CALCIUM SPEC-SCNC: 9.3 MG/DL (ref 8.6–10.5)
CHLORIDE SERPL-SCNC: 100 MMOL/L (ref 98–107)
CO2 SERPL-SCNC: 25.5 MMOL/L (ref 22–29)
CREAT SERPL-MCNC: 0.96 MG/DL (ref 0.76–1.27)
DEPRECATED RDW RBC AUTO: 43.2 FL (ref 37–54)
EGFRCR SERPLBLD CKD-EPI 2021: 98.1 ML/MIN/1.73
ERYTHROCYTE [DISTWIDTH] IN BLOOD BY AUTOMATED COUNT: 12.7 % (ref 12.3–15.4)
GLUCOSE SERPL-MCNC: 101 MG/DL (ref 65–99)
HCT VFR BLD AUTO: 38.6 % (ref 37.5–51)
HGB BLD-MCNC: 13.2 G/DL (ref 13–17.7)
MCH RBC QN AUTO: 31.5 PG (ref 26.6–33)
MCHC RBC AUTO-ENTMCNC: 34.2 G/DL (ref 31.5–35.7)
MCV RBC AUTO: 92.1 FL (ref 79–97)
PLATELET # BLD AUTO: 263 10*3/MM3 (ref 140–450)
PMV BLD AUTO: 8.5 FL (ref 6–12)
POTASSIUM SERPL-SCNC: 4.1 MMOL/L (ref 3.5–5.2)
RBC # BLD AUTO: 4.19 10*6/MM3 (ref 4.14–5.8)
SODIUM SERPL-SCNC: 134 MMOL/L (ref 136–145)
WBC NRBC COR # BLD: 8.69 10*3/MM3 (ref 3.4–10.8)

## 2023-08-09 PROCEDURE — C1769 GUIDE WIRE: HCPCS | Performed by: INTERNAL MEDICINE

## 2023-08-09 PROCEDURE — C1725 CATH, TRANSLUMIN NON-LASER: HCPCS | Performed by: INTERNAL MEDICINE

## 2023-08-09 PROCEDURE — 25010000002 MIDAZOLAM PER 1 MG: Performed by: INTERNAL MEDICINE

## 2023-08-09 PROCEDURE — 92978 ENDOLUMINL IVUS OCT C 1ST: CPT | Performed by: INTERNAL MEDICINE

## 2023-08-09 PROCEDURE — 25510000001 IOPAMIDOL PER 1 ML: Performed by: INTERNAL MEDICINE

## 2023-08-09 PROCEDURE — 85347 COAGULATION TIME ACTIVATED: CPT

## 2023-08-09 PROCEDURE — 25010000002 NITROGLYCERIN 100-5 MCG/ML-% SOLUTION: Performed by: INTERNAL MEDICINE

## 2023-08-09 PROCEDURE — C9600 PERC DRUG-EL COR STENT SING: HCPCS | Performed by: INTERNAL MEDICINE

## 2023-08-09 PROCEDURE — 25010000002 FENTANYL CITRATE (PF) 50 MCG/ML SOLUTION: Performed by: INTERNAL MEDICINE

## 2023-08-09 PROCEDURE — 94799 UNLISTED PULMONARY SVC/PX: CPT

## 2023-08-09 PROCEDURE — G0378 HOSPITAL OBSERVATION PER HR: HCPCS

## 2023-08-09 PROCEDURE — C1874 STENT, COATED/COV W/DEL SYS: HCPCS | Performed by: INTERNAL MEDICINE

## 2023-08-09 PROCEDURE — 25010000002 EPTIFIBATIDE 20 MG/10ML SOLUTION: Performed by: INTERNAL MEDICINE

## 2023-08-09 PROCEDURE — 25010000002 HEPARIN (PORCINE) PER 1000 UNITS: Performed by: INTERNAL MEDICINE

## 2023-08-09 PROCEDURE — C1887 CATHETER, GUIDING: HCPCS | Performed by: INTERNAL MEDICINE

## 2023-08-09 PROCEDURE — 80048 BASIC METABOLIC PNL TOTAL CA: CPT | Performed by: INTERNAL MEDICINE

## 2023-08-09 PROCEDURE — C1894 INTRO/SHEATH, NON-LASER: HCPCS | Performed by: INTERNAL MEDICINE

## 2023-08-09 PROCEDURE — 92928 PRQ TCAT PLMT NTRAC ST 1 LES: CPT | Performed by: INTERNAL MEDICINE

## 2023-08-09 PROCEDURE — 85027 COMPLETE CBC AUTOMATED: CPT | Performed by: INTERNAL MEDICINE

## 2023-08-09 PROCEDURE — C1753 CATH, INTRAVAS ULTRASOUND: HCPCS | Performed by: INTERNAL MEDICINE

## 2023-08-09 DEVICE — XIENCE SKYPOINT™ EVEROLIMUS ELUTING CORONARY STENT SYSTEM 3.00 MM X 28 MM / RAPID-EXCHANGE
Type: IMPLANTABLE DEVICE | Site: CORONARY | Status: FUNCTIONAL
Brand: XIENCE SKYPOINT™

## 2023-08-09 DEVICE — XIENCE SKYPOINT™ EVEROLIMUS ELUTING CORONARY STENT SYSTEM 2.50 MM X 38 MM / RAPID-EXCHANGE
Type: IMPLANTABLE DEVICE | Site: CORONARY | Status: FUNCTIONAL
Brand: XIENCE SKYPOINT™

## 2023-08-09 DEVICE — XIENCE SKYPOINT™ EVEROLIMUS ELUTING CORONARY STENT SYSTEM 3.50 MM X 23 MM / RAPID-EXCHANGE
Type: IMPLANTABLE DEVICE | Site: CORONARY | Status: FUNCTIONAL
Brand: XIENCE SKYPOINT™

## 2023-08-09 RX ORDER — HEPARIN SODIUM 1000 [USP'U]/ML
INJECTION, SOLUTION INTRAVENOUS; SUBCUTANEOUS
Status: DISCONTINUED | OUTPATIENT
Start: 2023-08-09 | End: 2023-08-09 | Stop reason: HOSPADM

## 2023-08-09 RX ORDER — ACETAMINOPHEN 325 MG/1
650 TABLET ORAL EVERY 4 HOURS PRN
Status: DISCONTINUED | OUTPATIENT
Start: 2023-08-09 | End: 2023-08-09

## 2023-08-09 RX ORDER — FENTANYL CITRATE 50 UG/ML
INJECTION, SOLUTION INTRAMUSCULAR; INTRAVENOUS
Status: DISCONTINUED | OUTPATIENT
Start: 2023-08-09 | End: 2023-08-09 | Stop reason: HOSPADM

## 2023-08-09 RX ORDER — MIDAZOLAM HYDROCHLORIDE 1 MG/ML
INJECTION INTRAMUSCULAR; INTRAVENOUS
Status: DISCONTINUED | OUTPATIENT
Start: 2023-08-09 | End: 2023-08-09 | Stop reason: HOSPADM

## 2023-08-09 RX ORDER — ATORVASTATIN CALCIUM 40 MG/1
40 TABLET, FILM COATED ORAL NIGHTLY
Status: DISCONTINUED | OUTPATIENT
Start: 2023-08-09 | End: 2023-08-10 | Stop reason: HOSPADM

## 2023-08-09 RX ORDER — SODIUM CHLORIDE 9 MG/ML
INJECTION, SOLUTION INTRAVENOUS
Status: COMPLETED | OUTPATIENT
Start: 2023-08-09 | End: 2023-08-09

## 2023-08-09 RX ORDER — SODIUM CHLORIDE 9 MG/ML
100 INJECTION, SOLUTION INTRAVENOUS CONTINUOUS
Status: DISCONTINUED | OUTPATIENT
Start: 2023-08-09 | End: 2023-08-10 | Stop reason: HOSPADM

## 2023-08-09 RX ORDER — NICARDIPINE HCL-0.9% SOD CHLOR 1 MG/10 ML
SYRINGE (ML) INTRAVENOUS
Status: DISCONTINUED | OUTPATIENT
Start: 2023-08-09 | End: 2023-08-09 | Stop reason: HOSPADM

## 2023-08-09 RX ORDER — LIDOCAINE HYDROCHLORIDE 20 MG/ML
INJECTION, SOLUTION INFILTRATION; PERINEURAL
Status: DISCONTINUED | OUTPATIENT
Start: 2023-08-09 | End: 2023-08-09 | Stop reason: HOSPADM

## 2023-08-09 RX ORDER — NITROGLYCERIN 0.4 MG/1
0.4 TABLET SUBLINGUAL
Status: DISCONTINUED | OUTPATIENT
Start: 2023-08-09 | End: 2023-08-10 | Stop reason: HOSPADM

## 2023-08-09 RX ORDER — ASPIRIN 81 MG/1
81 TABLET ORAL DAILY
Status: DISCONTINUED | OUTPATIENT
Start: 2023-08-09 | End: 2023-08-10 | Stop reason: HOSPADM

## 2023-08-09 RX ORDER — VERAPAMIL HYDROCHLORIDE 2.5 MG/ML
INJECTION, SOLUTION INTRAVENOUS
Status: DISCONTINUED | OUTPATIENT
Start: 2023-08-09 | End: 2023-08-09 | Stop reason: HOSPADM

## 2023-08-09 RX ORDER — ACETAMINOPHEN 325 MG/1
650 TABLET ORAL EVERY 4 HOURS PRN
Status: DISCONTINUED | OUTPATIENT
Start: 2023-08-09 | End: 2023-08-10 | Stop reason: HOSPADM

## 2023-08-09 RX ORDER — FERROUS SULFATE 325(65) MG
325 TABLET ORAL
Status: DISCONTINUED | OUTPATIENT
Start: 2023-08-09 | End: 2023-08-10 | Stop reason: HOSPADM

## 2023-08-09 RX ORDER — EPTIFIBATIDE 20 MG/10ML
INJECTION INTRAVENOUS
Status: DISCONTINUED | OUTPATIENT
Start: 2023-08-09 | End: 2023-08-09 | Stop reason: HOSPADM

## 2023-08-09 RX ORDER — LISINOPRIL 2.5 MG/1
5 TABLET ORAL DAILY
Status: DISCONTINUED | OUTPATIENT
Start: 2023-08-10 | End: 2023-08-10 | Stop reason: HOSPADM

## 2023-08-09 RX ADMIN — SODIUM CHLORIDE 100 ML/HR: 9 INJECTION, SOLUTION INTRAVENOUS at 11:54

## 2023-08-09 RX ADMIN — ASPIRIN 81 MG: 81 TABLET, COATED ORAL at 11:54

## 2023-08-09 RX ADMIN — FERROUS SULFATE TAB 325 MG (65 MG ELEMENTAL FE) 325 MG: 325 (65 FE) TAB at 11:54

## 2023-08-09 RX ADMIN — ACETAMINOPHEN 650 MG: 325 TABLET ORAL at 20:09

## 2023-08-09 RX ADMIN — SODIUM CHLORIDE 100 ML/HR: 9 INJECTION, SOLUTION INTRAVENOUS at 13:00

## 2023-08-09 RX ADMIN — TICAGRELOR 90 MG: 90 TABLET ORAL at 20:06

## 2023-08-09 RX ADMIN — ATORVASTATIN CALCIUM 40 MG: 40 TABLET, FILM COATED ORAL at 20:06

## 2023-08-09 NOTE — Clinical Note
Second inflation of balloon - Max pressure = 14 patricia. 2nd Inflation of balloon - Duration = 10 seconds.

## 2023-08-09 NOTE — Clinical Note
Allergies reviewed.  H&P note has been confirmed for the patient. Procedural consent has been signed.  Blood consent has not been signed. Staff has reviewed the patient's labs.  Labs have been reviewed and are in within normal limits,

## 2023-08-09 NOTE — Clinical Note
Second inflation of balloon - Max pressure = 20 patricia. 2nd Inflation of balloon - Duration = 11 seconds.

## 2023-08-09 NOTE — INTERVAL H&P NOTE
H&P reviewed. The patient was examined and there are no changes to the H&P.        Patient's a CT coronary angiogram was abnormal for CAD.  Pertinent positive ROS documented in HPI  Physical exam unremarkable    I have explained the risks associated with the procedure to the patient including but not limited to an allergic reaction to the contrast material or medications used during the procedure bleeding, infection, and bruising at the catheter insertion site blood clots, which may trigger heart attack, stroke,   damage to the artery where the catheter was inserted, or damage to the arteries as the catheter travels through your body, irregular heart rhythm arrhythmias, kidney damage caused by the contrast material.

## 2023-08-09 NOTE — Clinical Note
Hemostasis started on the right radial artery. R-Band was used in achieving hemostasis. Radial compression device applied to vessel. Hemostasis achieved successfully. Closure device additional comment: 14ML AIR

## 2023-08-09 NOTE — Clinical Note
Third inflation of balloon - Max pressure = 14 patricia. 3rd Inflation of balloon - Duration = 9 seconds.

## 2023-08-09 NOTE — Clinical Note
Second inflation of balloon - Max pressure = 14 patricia. 2nd Inflation of balloon - Duration = 18 seconds.

## 2023-08-09 NOTE — PLAN OF CARE
Goal Outcome Evaluation:  Pt is resting in bed with no s/s of distress noted at this time. VSS. IV access maintained throughout shift. Pt is pleasant and cooperative. No requests at this time. Will continue with plan of care.

## 2023-08-09 NOTE — NURSING NOTE
Time In 1318 Time Out 1325      Order received for Cardiac Rehab Consultation.       Information discussed with: Patient        Educated on: Benefits of Exercise,  Educated on Cardiac Rehab and Program Protocol, Brochure and/or educational material provided, Contact information given, and Teach Back Verified              Comments: Pt stated that he wasn't interested in the program. He stated that he was active at home. I told pt if he changes his mind then he can call us back anytime and we will gladly get him scheduled.       Thank you for the referral. Please contact the Cardiac Rehab Dept. (ext. [phone]) with any further questions or concerns.

## 2023-08-10 VITALS
DIASTOLIC BLOOD PRESSURE: 65 MMHG | OXYGEN SATURATION: 98 % | HEART RATE: 53 BPM | HEIGHT: 66 IN | BODY MASS INDEX: 21.23 KG/M2 | RESPIRATION RATE: 18 BRPM | TEMPERATURE: 98.1 F | SYSTOLIC BLOOD PRESSURE: 108 MMHG | WEIGHT: 132.1 LBS

## 2023-08-10 LAB
ANION GAP SERPL CALCULATED.3IONS-SCNC: 11.8 MMOL/L (ref 5–15)
BUN SERPL-MCNC: 10 MG/DL (ref 6–20)
BUN/CREAT SERPL: 10.8 (ref 7–25)
CALCIUM SPEC-SCNC: 8.6 MG/DL (ref 8.6–10.5)
CHLORIDE SERPL-SCNC: 99 MMOL/L (ref 98–107)
CHOLEST SERPL-MCNC: 104 MG/DL (ref 0–200)
CO2 SERPL-SCNC: 20.2 MMOL/L (ref 22–29)
CREAT SERPL-MCNC: 0.93 MG/DL (ref 0.76–1.27)
DEPRECATED RDW RBC AUTO: 42.4 FL (ref 37–54)
EGFRCR SERPLBLD CKD-EPI 2021: 101.9 ML/MIN/1.73
ERYTHROCYTE [DISTWIDTH] IN BLOOD BY AUTOMATED COUNT: 12.4 % (ref 12.3–15.4)
GLUCOSE SERPL-MCNC: 127 MG/DL (ref 65–99)
HBA1C MFR BLD: 5.8 % (ref 4.8–5.6)
HCT VFR BLD AUTO: 33.5 % (ref 37.5–51)
HDLC SERPL-MCNC: 46 MG/DL (ref 40–60)
HGB BLD-MCNC: 11.5 G/DL (ref 13–17.7)
LDLC SERPL CALC-MCNC: 45 MG/DL (ref 0–100)
LDLC/HDLC SERPL: 1.03 {RATIO}
MCH RBC QN AUTO: 31.4 PG (ref 26.6–33)
MCHC RBC AUTO-ENTMCNC: 34.3 G/DL (ref 31.5–35.7)
MCV RBC AUTO: 91.5 FL (ref 79–97)
PLATELET # BLD AUTO: 232 10*3/MM3 (ref 140–450)
PMV BLD AUTO: 8.7 FL (ref 6–12)
POTASSIUM SERPL-SCNC: 3.9 MMOL/L (ref 3.5–5.2)
RBC # BLD AUTO: 3.66 10*6/MM3 (ref 4.14–5.8)
SODIUM SERPL-SCNC: 131 MMOL/L (ref 136–145)
TRIGL SERPL-MCNC: 53 MG/DL (ref 0–150)
VLDLC SERPL-MCNC: 13 MG/DL (ref 5–40)
WBC NRBC COR # BLD: 9.42 10*3/MM3 (ref 3.4–10.8)

## 2023-08-10 PROCEDURE — 85027 COMPLETE CBC AUTOMATED: CPT | Performed by: INTERNAL MEDICINE

## 2023-08-10 PROCEDURE — 83036 HEMOGLOBIN GLYCOSYLATED A1C: CPT | Performed by: INTERNAL MEDICINE

## 2023-08-10 PROCEDURE — 80061 LIPID PANEL: CPT | Performed by: INTERNAL MEDICINE

## 2023-08-10 PROCEDURE — 99239 HOSP IP/OBS DSCHRG MGMT >30: CPT | Performed by: INTERNAL MEDICINE

## 2023-08-10 PROCEDURE — G0378 HOSPITAL OBSERVATION PER HR: HCPCS

## 2023-08-10 PROCEDURE — 80048 BASIC METABOLIC PNL TOTAL CA: CPT | Performed by: INTERNAL MEDICINE

## 2023-08-10 RX ADMIN — FERROUS SULFATE TAB 325 MG (65 MG ELEMENTAL FE) 325 MG: 325 (65 FE) TAB at 08:07

## 2023-08-10 RX ADMIN — LISINOPRIL 5 MG: 2.5 TABLET ORAL at 08:08

## 2023-08-10 RX ADMIN — TICAGRELOR 90 MG: 90 TABLET ORAL at 08:08

## 2023-08-10 RX ADMIN — ASPIRIN 81 MG: 81 TABLET, COATED ORAL at 08:08

## 2023-08-10 NOTE — DISCHARGE SUMMARY
[facility] Medicine Services  DISCHARGE SUMMARY    Date of Admission: 8/9/2023    Date of Discharge:  8/10/2023    PCP: Fany Rocha APRN    Discharging Provider: Dr. Francisco; also evaluated by Estelle Arauz PA-C      Admission Diagnosis:   Please see admission H&P    Discharge Diagnosis:   ASCVD status post RCA PCI with full metal jacket, recent PCI of LAD, on DAPT with Asa and Brilinta  Tobacco abuse  Dyslipidemia      Procedures Performed:    Results for orders placed during the hospital encounter of 08/09/23    Cardiac Catheterization/Vascular Study    Narrative  Images from the original result were not included.  CARDIAC CATHETERIZATION / INTERVENTION REPORT            DATE OF PROCEDURE: 8/9/2023      INDICATION FOR PROCEDURE: Staged RCA PCI, recent LAD PCI in the setting of Angina ccs 2 and abnormal coronary CTA      PRE PROCEDURE DIAGNOSIS:    Clinical frailty: 3- Managing Well    ASA: 2=2- Mild to moderate systemic disease, medially well controlled, with no functional limitation    Mallampati: Class 2=2- Able to visualize the soft palate, fauces, uvula.  The anterior & posterior tonsilar pillars are hidden by the tongue.    POST PROCEDURE DIAGNOSIS:  CAD with RCA along multiple tandem 70 to 80% calcified stenosis status post PCI with a full metal jacket, 2.5 x 38 mm nilo point drug-eluting stent in the distal RCA followed by a 3.0 x 28 mm nilo point drug-eluting stent in the mid RCA followed by a 3.5 x 23 mm nilo point drug-eluting stent in the proximal RCA all overlapping all extensively postdilated with a 3.5 NC balloon.      Face to face mdoerate conscious  sedation time :      COMPLICATIONS : None    Specimens collected : None    PROCEDURE PERFORMED:    Selective RCA angiogram  RCA PCI  IVUS of RCA        Description of the procedure:  Prior to the procedure risk, benefits and possible alternative were discussed with the patient and informed consent was obtained. Patient  was brought to cardiac cath lab table in post absorbtive state. Patient was prepped and drape in usual sterile fashion. IV Versed and Fentanyl was used for moderate sedation. 2% Lidocaine was used for topical anesthesia. R radial arterial site was prepped and a micropuncture needle was used to access the artery and a 5 F slender sheath was placed. 2.5 mg of Verapamil and 200 mcg of NTG was given through the sheath intra arterial and 5000 units of Heparin was given once the catheter crossed the aortic arch.    5 F TIG 4 catheters was used for right and left coronary angiogram and 5 F pigtail catheter was used for Left heart hemodynamics and left ventriculography. All the catheters were exchanged over 0.035 wire. The R radial arterial sheath was removed and TR band was applied and immediate and complete hemostasis was achieved. The patient tolerate the entire procedure well without any immediate known complications.          PERCUTANEOUS CORONARY INTERVENTION PROCEDURE NOTE:    Patient's RCA had a known long segment lesions all the way from the proximal to distal with multiple tandem 70 to 80% calcified stenosis.  I initially did IVUS pre and I had challenges and even getting IVUS catheter across the more proximal calcified lesion.  I also initially planned to do an IFR because of no functional assessment available but since IVUS catheter was not able to cross even the more proximal lesion I decided to proceed with the PCI.    Heparin monotherapy was used for anticoagulation.  Total of 6000 units was given IV.  Patient also received a single bolus of Integrilin.    6 [place] citizen JR4 guide was used to engage the RCA coaxially.    0.014 Runthrough guidewire was used to cross the lesion and parked distally.    Used a 2.5 x 15 compliant TREK balloon to predilate the lesion at 12 patricia.  I then tried to advance a 2.5 x 30 mm stent but the stent was getting caught in the more proximal calcified lesion, I then used a 6 [place] citizen guide  liner and with the help of Olivia I was able to deliver the stent to the more distal lesion and deployed at 14 patricia.  Steams 10 balloon was used to predilate the more proximal lesion.  I then used a 3.0 x 23 mm nilo point drug-eluting stent deployed in the mid RCA overlapping with the first stent.  The more proximal calcified lesion was more apparent and I decided to cover that given the diabetes over the extensive to 70 degree arc of calcium in that segment and again the IVUS catheter would not pass beyond this lesion this is at the takeoff of the first marginal branch.  I then used a 3.5 x 23 mm nilo point drug-eluting stent, did I was prior to the stenting and it did show medial vessel diameter of 3.5-4 in the proximal portion.  I then used a 3.5 NC balloon to post dilate and multiple segments of the overlapping stents at high pressures.      Post stent deployment angio pictures showed good expansion with 0% residual stenosis, CATALINA 3 flow in the distal vascular bed and no dissection or distal wire perforation.    Lesion length: 50 mm  Pre PCI Stenosis: 80 %  Post PCI stenosis: 0 %  Pre PCI CATALINA flow: 3  Post PCI CATALINA flow: 3    EBL: Less than 10cc          Recommendations:  Aggressive guideline directed medical management for CAD  Dual Anti platelet medication with Asa 81 mg qd and Brilinta 90 mg bid for minimum 1 year.            Felton Mcgovern MD, [facility]  Interventional Cardiology    08/09/23  11:27 EDT         HPI     History of Present Illness:  Rico Loaiza is a 47 y.o. male with abnormal CT coronary angiogram and positive review of systems for chest tightness and pressure who followed up with interventional cardiology after persistent symptoms following an unremarkable echocardiogram and stress test though with CT angiogram concerning.  He was admitted following cardiac catheterization see hospital course as outlined below       Hospital Course     Hospital Course  Rico Loaiza was admitted as outlined  in above HPI.     Patient was admitted on August 9, 2023 following cardiac catheterization by Dr. Francisco as outlined within this documentation.  Given persistent chest pain CT coronary angiogram was ordered per interventional cardiology.  Results ultimately revealed extensive calcified plaque throughout major coronary arterial system limiting stenosis characterization and moderate stenosis noted involving the RCA, LAD, D1 branch OM1 and proximal circumflex with further assessment with cardiac cath recommended.  Patient presented for outpatient cardiac cath on August 9, 2023 as outlined.  Patient required stenting as outlined in cardiac catheterization report within this document.  As outlined in this document his RCA had known long segment lesions all the way from proximal to distal with multiple tandem and 70 to 80% stent stenosis ultimately requiring PCI as outlined in report.    Aggressive guideline medical directed therapy for CAD was recommended.  Recommendation was for dual antiplatelet medication with aspirin 81 mg daily and Brilinta 90 mg twice daily for a minimum of 1 years duration.    On the day of evaluation patient was alert and oriented x 3 evaluated in his bed and reported he felt well and has not had any further chest pain since procedure.  He denied chest pain.  Importance of dual antiplatelet therapy for at least 1 years duration was discussed at length with patient with him acknowledging treatment plan and importance of compliance.    Pertinent Laboratory and Radiology Results     Pertinent Test Results:          Results from last 7 days   Lab Units 08/10/23  0019 08/09/23  0831   WBC 10*3/mm3 9.42 8.69   HEMOGLOBIN g/dL 11.5* 13.2   HEMATOCRIT % 33.5* 38.6   PLATELETS 10*3/mm3 232 263   MCV fL 91.5 92.1   SODIUM mmol/L 131* 134*   POTASSIUM mmol/L 3.9 4.1   CHLORIDE mmol/L 99 100   CO2 mmol/L 20.2* 25.5   BUN mg/dL 10 15   CREATININE mg/dL 0.93 0.96   GLUCOSE mg/dL 127* 101*   CALCIUM mg/dL 8.6  9.3          Results from last 7 days   Lab Units 08/10/23  0019 08/09/23  0831   WBC 10*3/mm3 9.42 8.69           Invalid input(s): PROT, LABALBU  Results from last 7 days   Lab Units 08/10/23  0019   CHOLESTEROL mg/dL 104   TRIGLYCERIDES mg/dL 53   HDL CHOL mg/dL 46     Results from last 7 days   Lab Units 08/10/23  0019   HEMOGLOBIN A1C % 5.80*     Brief Urine Lab Results       None                    Results for orders placed during the hospital encounter of 05/31/23    Adult Transthoracic Echo Complete W/ Cont if Necessary Per Protocol    Interpretation Summary    Normal left ventricular cavity size and wall thickness noted. All left ventricular wall segments contract normally.    Left ventricular ejection fraction appears to be 61 - 65%.    The aortic valve is not well visualized. No aortic valve regurgitation or stenosis is present. The aortic valve is grossly normal in structure.    The mitral valve is structurally normal with no significant stenosis present. Mild mitral valve regurgitation is present.    There is no evidence of pericardial effusion. .            ----------------------------------------------------------------------------------------------------------------------  Cardiac Catheterization/Vascular Study    Addendum Date: 7/28/2023                    CARDIAC CATHETERIZATION / INTERVENTION REPORT  DATE OF PROCEDURE: 7/27/2023  INDICATION FOR PROCEDURE: Abnormal Coronary CT angiogram POST PROCEDURE DIAGNOSIS: CAD wit mid LAD 80% stenosis s/p PCI with 3.0 x 18 mm VITO Diastolic Dysfunction with LVEDP of 21 mmHg Face to face mdoerate conscious  sedation time : COMPLICATIONS : None Specimens collected : None  PROCEDURE PERFORMED: 1. Selective right and left Coronary Angiogram 2. Left heart catheretization  3. Left Ventriculography 4. RFR of LAD - attempted but not performed due to malfunction system 5. PCI of LAD 6. IVUS of LAD Description of the procedure: Prior to the procedure risk, benefits and  possible alternative were discussed with the patient and informed consent was obtained. Patient was brought to cardiac cath lab table in post absorbtive state. Patient was prepped and drape in usual sterile fashion. IV Versed and Fentanyl was used for moderate sedation. 2% Lidocaine was used for topical anesthesia. R radial arterial site was prepped and a micropuncture needle was used to access the artery and a 5 F slender sheath was placed. 2.5 mg of Verapamil and 200 mcg of NTG was given through the sheath intra arterial and 5000 units of Heparin was given once the catheter crossed the aortic arch.  5 F TIG 4 catheters was used for right and left coronary angiogram and 5 F pigtail catheter was used for Left heart hemodynamics and left ventriculography. All the catheters were exchanged over 0.035 wire. The R radial arterial sheath was removed and TR band was applied and immediate and complete hemostasis was achieved. The patient tolerate the entire procedure well without any immediate known complications. Coronary anatomy findings: LM: Is a large calibre vessel , normal take off from left cusp, divides into LAD and Lcx. No stenosis LAD:  Mid lad tubular stenosis after take off D1 artery, D1 itself had no stenosis, IVUS showed focal calcified eccentric stenosis just prior to D1 take off too, but angiographically it did not look significant so deferred PCI, very distal LAD had focal 60% stenosis and deferred stenosis since it very distal. LCX: Moderate calibre vessel, proximal mild 40% stenosis, continues as large OM branch with no stenosis RCA: Large calibre, dominant artery, normal take off from right cusp. Entire mid RCA had long tandem 70% stenosis. RPL had no stenosis, R PDA is small calibre with no stenosis Left Ventriculography: LV systolic function was normal with visual estimated EF of 55%. No wall motion abnormalities. No significant mitral regurgitation noted. LVEDP: 21 mmHg No gradient across the aortic  valve on pull back. PERCUTANEOUS CORONARY INTERVENTION PROCEDURE NOTE: Heparin monotherapy was used for anticoagulation. Total of 7000 Units was given IV. XB 3.5 guide was used to engage the lm coaxially. 0.014 Runthrough guidewire was used to cross the lesion and parked distally. Used a 2.0 x 15 Compliant TREK balloon to predilate the lesion at 10 patricia. IVUS measures distal vessel ref D of 3.0 mm Prepped a 3.0 x 18 [place] [employer] eluting stent and position at the lesion and successfully deployed at 16 patricia. Post stent deployment angio pictures showed good expansion with 0% residual stenosis, CATALINA 3 flow in the distal vascular bed and no dissection or distal wire perforation. Lesion length: 15 mm Pre PCI Stenosis: 80 % Post PCI stenosis: 0 % Pre PCI CATALINA flow: 3 Post PCI CATALINA flow: 3 EBL: Less than 10cc Final Impression: CAD wit mid LAD 80% stenosis s/p PCI with 3.0 x 18 mm VITO Diastolic Dysfunction with LVEDP of 21 mmHg Recommendations: Aggressive guideline directed medical management for CAD Dual Anti platelet medication with Asa 81 mg qd and Brilinta 90 mg bid for minimum 1 year. Will need staged intervention of RCA Felton MD Froilan, [facility] Interventional Cardiology 07/27/23 13:34 EDT     CT Angiogram Coronary    Result Date: 7/20/2023   1. Extensive calcified plaque throughout the major coronary arterial system which limits stenosis characterization. 2. At least moderate stenoses are noted involving the RCA, LAD, D1 branch, OM1, and proximal circumflex, further assessment with catheter angiography is warranted. 3. Total calcium score: 812; corresponds to  percentile for patient demographic. 4. Estimated EF: 70%. 5. Other nonacute findings as above.      This report was finalized on 7/20/2023 9:01 AM by Dr. Jake Hurt MD.         Microbiology Results (last 10 days)       ** No results found for the last 240 hours. **            Labs above have been reviewed on the day of discharge.  Radiology images from  prior 30 days were reviewed prior to discharge as incorporated into this document.     Discharge Vitals and Physical Examination       Vital Signs  Temp:  [97.8 øF (36.6 øC)-98.1 øF (36.7 øC)] 98.1 øF (36.7 øC)  Heart Rate:  [50-64] 53  Resp:  [18-20] 18  BP: (103-123)/(51-70) 108/65     PHYSICAL EXAMINATION:   Physical Exam  Vitals and nursing note reviewed.   Constitutional:       Appearance: Normal appearance.   Cardiovascular:      Rate and Rhythm: Normal rate and regular rhythm.   Pulmonary:      Effort: No tachypnea or bradypnea.   Musculoskeletal:         General: No swelling or tenderness.   Skin:     General: Skin is warm and dry.      Comments: RUE radial cath site without significant swelling, erythema,or exudate.    Neurological:      Mental Status: He is alert.         Discharge Disposition, Discharge Medications, and Discharge Appointments     Discharge Disposition:   home    Condition on Discharge:  Fair    Discharge Medications:     Discharge Medications        Continue These Medications        Instructions Start Date   acetaminophen 325 MG tablet  Commonly known as: TYLENOL   650 mg, Oral, Every 4 Hours PRN      Aspirin Low Dose 81 MG EC tablet  Generic drug: aspirin   81 mg, Oral, Daily      atorvastatin 40 MG tablet  Commonly known as: LIPITOR   40 mg, Oral, Nightly      Brilinta 90 MG tablet tablet  Generic drug: ticagrelor   90 mg, Oral, 2 Times Daily      ferrous sulfate 325 (65 FE) MG tablet   325 mg, Oral, Daily With Breakfast      lisinopril 5 MG tablet  Commonly known as: PRINIVIL,ZESTRIL   5 mg, Oral, Daily      nitroglycerin 0.4 MG SL tablet  Commonly known as: NITROSTAT   Place 1 tablet under the tongue Every 5 minutes as needed for Chest Pain (Systolic blood pressure greater than 100). Take no more than 3 doses in 15 minutes.               Discharged medication regimen discussed with attending physician prior to discharge.     Discharge Diet:   cardiac diet  Dietary Orders (From  admission, onward)       Start     Ordered    08/09/23 1112  Diet: Cardiac Diets; Healthy Heart (2-3 Na+); Texture: Regular Texture (IDDSI 7); Fluid Consistency: Thin (IDDSI 0)  Diet Effective Now        References:    Diet Order Crosswalk   Question Answer Comment   Diets: Cardiac Diets    Cardiac Diet: Healthy Heart (2-3 Na+)    Texture: Regular Texture (IDDSI 7)    Fluid Consistency: Thin (IDDSI 0)        08/09/23 1111                           Discharge Disposition:    Home or Self Care        Follow-up Appointments:  Your Scheduled Appointments      Aug 28, 2023  9:45 AM  Follow Up with Felton Yaniv Mcgovern MD  [facility] ([place]) [address] [facility] [address]  [place] [address] [phone]   -Bring photo ID, insurance card, and list of medications to appointment  -If testing was completed outside of [facility] then patient must bring images on a disc  -Copay will be collected at time of appointment  -Established patients should arrive at time of appointment         Sep 08, 2023 11:15 AM  Follow Up with Bob Maldonado PA-C  [facility] ([place]) [address]  [place] [address] [phone]   -Bring photo ID, insurance card, and list of medications to appointment  -If testing was completed outside of [facility] then patient must bring images on a disc  -Copay will be collected at time of appointment  -Established patients should arrive 10 minutes prior to appointment                 Follow-up Information       Fany Rocha APRN .    Specialty: Family Medicine  Why: Office is closed today. Someone will call and make appointment in the morning and make Pt aware.  Contact information:  [address] [place] [address] [address]  [place] [address] [phone]                                 Test Results Pending at Discharge:        The ASCVD Risk score (Trae DK, et al., 2019) failed to calculate for the following reasons:    The valid total  cholesterol range is 130 to 320 mg/dL      Estelle Arauz PA-C  [facility] Medicine Team  08/10/23  12:34 EDT      Time: Greater than 30 minutes spent on this discharge.  I spent 35 minutes on this discharge activity which included:  Face-to-face encounter with the patient, discussing plan with attending physician, reviewing the data in the system, coordination of the care with the nursing staff as well as consultations, documentation, and entering orders.               Felton Mcgovern MD, FACC  Interventional Cardiology

## 2023-08-10 NOTE — NURSING NOTE
Pt being discharged home today on room air. No needs identified at this time. CATE Burrell made aware the discharge is complete. She stated family will provide transportation.      Update: 4883  Waiting on wife to transport home.

## 2023-08-10 NOTE — PLAN OF CARE
Goal Outcome Evaluation:  Plan of Care Reviewed With: patient        Progress: improving  Outcome Evaluation: PT is resting in bed at this time. PT has had no complaints of pain or discomfort. TR Band was removed at 2000, with no complications of bleeding and remains intact. PT has been up ambulating the hallways throughout shift. NS infuses at 100mL/hr. PT is hopeful for discharge this AM. VSS. Afebrile. Will Continue to Monitor PT.        Problem: Adult Inpatient Plan of Care  Goal: Plan of Care Review  Outcome: Ongoing, Progressing  Flowsheets (Taken 8/10/2023 0308)  Progress: improving  Plan of Care Reviewed With: patient  Outcome Evaluation: PT is resting in bed at this time. PT has had no complaints of pain or discomfort. TR Band was removed at 2000, with no complications of bleeding and remains intact. PT has been up ambulating the hallways throughout shift. NS infuses at 100mL/hr. PT is hopeful for discharge this AM. VSS. Afebrile. Will Continue to Monitor PT.  Goal: Patient-Specific Goal (Individualized)  Outcome: Ongoing, Progressing  Goal: Absence of Hospital-Acquired Illness or Injury  Outcome: Ongoing, Progressing  Intervention: Identify and Manage Fall Risk  Recent Flowsheet Documentation  Taken 8/10/2023 0100 by Tressa Mead, RN  Safety Promotion/Fall Prevention:   activity supervised   clutter free environment maintained   assistive device/personal items within reach   fall prevention program maintained   nonskid shoes/slippers when out of bed   room organization consistent   safety round/check completed  Taken 8/9/2023 2300 by Tressa Mead, RN  Safety Promotion/Fall Prevention:   activity supervised   assistive device/personal items within reach   clutter free environment maintained   fall prevention program maintained   nonskid shoes/slippers when out of bed   safety round/check completed   room organization consistent  Taken 8/9/2023 2100 by Tressa Mead, RN  Safety Promotion/Fall  Prevention:   activity supervised   assistive device/personal items within reach   clutter free environment maintained   fall prevention program maintained   nonskid shoes/slippers when out of bed   room organization consistent   safety round/check completed  Taken 8/9/2023 1900 by Tressa Mead RN  Safety Promotion/Fall Prevention:   activity supervised   assistive device/personal items within reach   clutter free environment maintained   fall prevention program maintained   nonskid shoes/slippers when out of bed   room organization consistent   safety round/check completed  Intervention: Prevent Skin Injury  Recent Flowsheet Documentation  Taken 8/9/2023 2000 by Tressa Mead RN  Skin Protection:   adhesive use limited   drying agents applied   incontinence pads utilized  Intervention: Prevent and Manage VTE (Venous Thromboembolism) Risk  Recent Flowsheet Documentation  Taken 8/9/2023 2000 by Tressa Mead RN  VTE Prevention/Management: (See MAR) other (see comments)  Intervention: Prevent Infection  Recent Flowsheet Documentation  Taken 8/10/2023 0100 by Tressa Mead RN  Infection Prevention: rest/sleep promoted  Taken 8/9/2023 2300 by Tressa Mead RN  Infection Prevention: rest/sleep promoted  Taken 8/9/2023 2100 by Tressa Mead RN  Infection Prevention: rest/sleep promoted  Taken 8/9/2023 1900 by Tressa Mead RN  Infection Prevention: rest/sleep promoted  Goal: Optimal Comfort and Wellbeing  Outcome: Ongoing, Progressing  Intervention: Monitor Pain and Promote Comfort  Recent Flowsheet Documentation  Taken 8/9/2023 2000 by Tressa Mead RN  Pain Management Interventions:   see MAR   relaxation techniques promoted   position adjusted   pillow support provided  Intervention: Provide Person-Centered Care  Recent Flowsheet Documentation  Taken 8/9/2023 2000 by Tressa Mead RN  Trust Relationship/Rapport:   care explained   choices provided   empathic listening provided   questions  answered   reassurance provided   thoughts/feelings acknowledged  Goal: Readiness for Transition of Care  Outcome: Ongoing, Progressing     Problem: Behavioral Health Comorbidity  Goal: Maintenance of Behavioral Health Symptom Control  Outcome: Ongoing, Progressing  Intervention: Maintain Behavioral Health Symptom Control  Recent Flowsheet Documentation  Taken 8/10/2023 0100 by Tressa Mead, RN  Medication Review/Management: medications reviewed  Taken 8/9/2023 2300 by Tressa Mead, RN  Medication Review/Management: medications reviewed  Taken 8/9/2023 2100 by Tressa Mead, RN  Medication Review/Management: medications reviewed  Taken 8/9/2023 1900 by Tressa Mead, RN  Medication Review/Management: medications reviewed     Problem: Chest Pain  Goal: Resolution of Chest Pain Symptoms  Outcome: Ongoing, Progressing

## 2023-08-10 NOTE — PLAN OF CARE
Goal Outcome Evaluation:         Patient being discharged this shift .IV and tele  Removed with  difficulty. Iv site is clear with no s/s  of  infection noted or reported.

## 2023-08-28 ENCOUNTER — OFFICE VISIT (OUTPATIENT)
Dept: CARDIOLOGY | Facility: CLINIC | Age: 48
End: 2023-08-28
Payer: COMMERCIAL

## 2023-08-28 VITALS
BODY MASS INDEX: 21.38 KG/M2 | HEART RATE: 49 BPM | HEIGHT: 66 IN | DIASTOLIC BLOOD PRESSURE: 69 MMHG | OXYGEN SATURATION: 99 % | WEIGHT: 133 LBS | SYSTOLIC BLOOD PRESSURE: 113 MMHG

## 2023-08-28 DIAGNOSIS — I25.10 ASCVD (ARTERIOSCLEROTIC CARDIOVASCULAR DISEASE): Chronic | ICD-10-CM

## 2023-08-28 DIAGNOSIS — E78.5 DYSLIPIDEMIA, GOAL LDL BELOW 70: Chronic | ICD-10-CM

## 2023-08-28 DIAGNOSIS — N52.8 OTHER MALE ERECTILE DYSFUNCTION: ICD-10-CM

## 2023-08-28 DIAGNOSIS — I10 ESSENTIAL HYPERTENSION: Primary | Chronic | ICD-10-CM

## 2023-08-28 PROCEDURE — 1160F RVW MEDS BY RX/DR IN RCRD: CPT | Performed by: NURSE PRACTITIONER

## 2023-08-28 PROCEDURE — 3078F DIAST BP <80 MM HG: CPT | Performed by: NURSE PRACTITIONER

## 2023-08-28 PROCEDURE — 93000 ELECTROCARDIOGRAM COMPLETE: CPT | Performed by: NURSE PRACTITIONER

## 2023-08-28 PROCEDURE — 99214 OFFICE O/P EST MOD 30 MIN: CPT | Performed by: NURSE PRACTITIONER

## 2023-08-28 PROCEDURE — 1159F MED LIST DOCD IN RCRD: CPT | Performed by: NURSE PRACTITIONER

## 2023-08-28 PROCEDURE — 3074F SYST BP LT 130 MM HG: CPT | Performed by: NURSE PRACTITIONER

## 2023-08-28 RX ORDER — BUPROPION HYDROCHLORIDE 150 MG/1
150 TABLET, EXTENDED RELEASE ORAL 2 TIMES DAILY
Qty: 60 TABLET | Refills: 11 | Status: SHIPPED | OUTPATIENT
Start: 2023-08-28

## 2023-08-28 RX ORDER — SILDENAFIL 50 MG/1
50 TABLET, FILM COATED ORAL DAILY PRN
Qty: 30 TABLET | Refills: 11 | Status: SHIPPED | OUTPATIENT
Start: 2023-08-28

## 2023-08-28 NOTE — PROGRESS NOTES
"Chief Complaint  Shortness of Breath (F/U), Chest Pain (F/U), dyslipidemia (F/U), Follow-up (Pt denies CP,SOA on exertion), and Med Review (Tolerating all current medications.)    Subjective          Rico Loaiza presents to [facility]ARDIOLOG[facility] for follow up.    History of Present Illness    Dario underwent staged PCI to the RCA on 8/9/2023.  He is here today for follow-up.    At today's visit Dario reports that he has been doing well.  He denies complaint of chest pain, palpitations, shortness of breath or dyspnea on exertion.    Dario does report that he is having difficulty with erectile dysfunction.    Objective     Vital Signs:   /69   Pulse (!) 49   Ht 167.6 cm (66\")   Wt 60.3 kg (133 lb)   SpO2 99%   BMI 21.47 kg/mý       Physical Exam  Vitals reviewed.   Constitutional:       Appearance: Normal appearance. He is well-developed.   Cardiovascular:      Rate and Rhythm: Normal rate and regular rhythm.      Heart sounds: No murmur heard.    No friction rub. No gallop.   Pulmonary:      Effort: Pulmonary effort is normal. No respiratory distress.      Breath sounds: Normal breath sounds. No wheezing or rales.   Skin:     General: Skin is warm and dry.   Neurological:      Mental Status: He is alert and oriented to person, place, and time.   Psychiatric:         Mood and Affect: Mood normal.         Behavior: Behavior normal.        Result Review :     CMP          7/28/2023    02:50 8/9/2023    08:31 8/10/2023    00:19   CMP   Glucose 97  101  127    BUN 9  15  10    Creatinine 0.91  0.96  0.93    EGFR 104.6  98.1  101.9    Sodium 132  134  131    Potassium 3.9  4.1  3.9    Chloride 102  100  99    Calcium 8.5  9.3  8.6    BUN/Creatinine Ratio 9.9  15.6  10.8    Anion Gap 8.0  8.5  11.8      CBC          7/28/2023    02:50 8/9/2023    08:31 8/10/2023    00:19   CBC   WBC 8.59  8.69  9.42    RBC 3.87  4.19  3.66    Hemoglobin 12.3  13.2  11.5    Hematocrit 35.9  38.6  33.5 "    MCV 92.8  92.1  91.5    MCH 31.8  31.5  31.4    MCHC 34.3  34.2  34.3    RDW 12.5  12.7  12.4    Platelets 256  263  232      Lipid Panel          7/27/2023    10:25 7/28/2023    02:50 8/10/2023    00:19   Lipid Panel   Total Cholesterol 118  105  104    Triglycerides 50  65  53    HDL Cholesterol 45  42  46    VLDL Cholesterol 12  14  13    LDL Cholesterol  61  49  45    LDL/HDL Ratio 1.40  1.19  1.03    wellbutrin  wellw  Data reviewed : Cardiology studies transthoracic echocardiogram and cardiac catheterization.      ECG 12 Lead    Date/Time: 8/28/2023 10:49 AM  Performed by: Jennifer Adame APRN  Authorized by: Jennifer Adame APRN   Comparison: compared with previous ECG from 8/4/2023  Similar to previous ECG  Comparison to previous ECG: Sinus rhythm 63 bpm  Rhythm: sinus bradycardia  Rate: bradycardic  BPM: 51  Other findings: early repolarization  Comments: QTc 366         Current Outpatient Medications   Medication Sig Dispense Refill    acetaminophen (TYLENOL) 325 MG tablet Take 2 tablets by mouth Every 4 (Four) Hours As Needed for Mild Pain or Fever (temperature greater than 101F). 90 tablet 1    aspirin 81 MG EC tablet Take 1 tablet by mouth Daily. 90 tablet 3    atorvastatin (LIPITOR) 40 MG tablet Take 1 tablet by mouth Every Night. 90 tablet 3    ferrous sulfate 325 (65 FE) MG tablet Take 1 tablet by mouth Daily With Breakfast.      lisinopril (PRINIVIL,ZESTRIL) 5 MG tablet Take 1 tablet by mouth Daily. 90 tablet 3    ticagrelor (BRILINTA) 90 MG tablet tablet Take 1 tablet by mouth 2 (Two) Times a Day. 180 tablet 3    buPROPion SR (Wellbutrin SR) 150 MG 12 hr tablet Take 1 tablet by mouth 2 (Two) Times a Day. 60 tablet 11    sildenafil (VIAGRA) 50 MG tablet Take 1 tablet by mouth Daily As Needed for Erectile Dysfunction. 30 tablet 11     No current facility-administered medications for this visit.            Assessment and Plan    Problem List Items Addressed This Visit          Cardiac and  Vasculature    ASCVD (arteriosclerotic cardiovascular disease) (Chronic)    Overview     5/31/2023 nuclear stress test: Myocardial perfusion imaging indicates a normal myocardial perfusion study with no evidence of ischemia  6/2/2023 TTE: LVEF 61 to 65%, mild MR  7/20/2023 CT angiogram the coronary arteries: Moderate stenosis are noted involving the RCA, LAD, D1, OM1, and proximal circumflex.  7/27/2023 [facility] for angina/abnormal coronary CT: PCI VITO to the LAD  8/9/2023 [facility] : Staged PCI VITO to the RCA.         Relevant Orders    ECG 12 Lead    Dyslipidemia, goal LDL below 55 (Chronic)    Overview     7/27/2023 total cholesterol 118, triglycerides 50, HDL 45, and LDL 61  8/10/2023 total cholesterol 104,  triglycerides 53, HDL 46, and LDL 45         Essential hypertension - Primary (Chronic)     Other Visit Diagnoses       Other male erectile dysfunction                    Follow Up     Medications were reviewed with the patient.    ASCVD is stable.  Patient is to continue aspirin, Brilinta, atorvastatin, and lisinopril.  He is not on beta-blocker secondary to low heart rate.    Continue atorvastatin for dyslipidemia.    Hypertension is controlled, continue current medications.    For patient's report of erectile dysfunction I have given him a prescription of sildenafil.  He has been cautioned regarding the use of sildenafil and nitroglycerin tabs.  He stated understanding.  I did not give him additional nitro tabs and I asked him to discontinue the use at home of his current supply.  He tells me he has not had to use any since it was initially given to him.    Risk factor modification: Smoking cessation counseling was given.  Patient advised regarding the correlation between cigarette smoking and coronary artery disease, as well as lung disease, cancer.  Patient was offered strategies to quit, including medication.  The patient is  interested in quitting.  We discussed options for tobacco cessation.  Patient states  that the patches are not working.  We discussed Wellbutrin versus Chantix.  Patient decided to try Wellbutrin.      Return in about 3 months (around 11/28/2023).    Patient was given instructions and counseling regarding his condition or for health maintenance advice. Please see specific information pulled into the AVS if appropriate.

## 2023-09-20 ENCOUNTER — OFFICE VISIT (OUTPATIENT)
Dept: CARDIOLOGY | Facility: CLINIC | Age: 48
End: 2023-09-20
Payer: COMMERCIAL

## 2023-09-20 VITALS
HEART RATE: 54 BPM | BODY MASS INDEX: 21.05 KG/M2 | HEIGHT: 66 IN | OXYGEN SATURATION: 99 % | SYSTOLIC BLOOD PRESSURE: 115 MMHG | DIASTOLIC BLOOD PRESSURE: 67 MMHG | WEIGHT: 131 LBS

## 2023-09-20 DIAGNOSIS — E78.5 DYSLIPIDEMIA, GOAL LDL BELOW 70: Chronic | ICD-10-CM

## 2023-09-20 DIAGNOSIS — I25.10 ASCVD (ARTERIOSCLEROTIC CARDIOVASCULAR DISEASE): Primary | Chronic | ICD-10-CM

## 2023-09-20 DIAGNOSIS — I10 ESSENTIAL HYPERTENSION: Chronic | ICD-10-CM

## 2023-09-20 PROCEDURE — 3078F DIAST BP <80 MM HG: CPT | Performed by: PHYSICIAN ASSISTANT

## 2023-09-20 PROCEDURE — 3074F SYST BP LT 130 MM HG: CPT | Performed by: PHYSICIAN ASSISTANT

## 2023-09-20 PROCEDURE — 99214 OFFICE O/P EST MOD 30 MIN: CPT | Performed by: PHYSICIAN ASSISTANT

## 2023-09-20 NOTE — PROGRESS NOTES
Fany Rocha APRN  Rico Loaiza  1975  09/20/2023    Patient Active Problem List   Diagnosis    Precordial pain    ASCVD (arteriosclerotic cardiovascular disease)    Dyslipidemia, goal LDL below 55    Essential hypertension       Dear Fany Rocha APRN:    Subjective     History of Present Illness:    Chief Complaint   Patient presents with    Med Management     Verbal.     Results     Labs, CT angio.      Coronary artery disease  5/31/2023 nuclear stress test showing normal myocardial perfusion with no evidence of ischemia  CT coronary angiogram 7/20/2023: Moderate stenosis involving RCA LAD and Lcx  [facility] 7/27/2023: PCI to the LAD  [facility] 8/9/2023: Staged PCI to the RCA      Rico Loaiza is a pleasant 48 y.o. male with a past medical history significant for coronary artery disease status post stenting of the LAD on 7/27/2023 with staged PCI of the RCA on 8/9/2023.  Patient also has lifelong history of tobacco abuse, Essential hypertension, dyslipidemia.  He comes in today for cardiology follow-up.    Dario reports he has been doing excellent since he was last seen he did undergo PCI to the LAD with staged PCI to the RCA.  He reports he tolerated these procedures well without any sequelae.  He reports his chest pains and shortness of breath have completely resolved.  He also denies any palpitations, syncope, or near syncope.  He did report he stopped smoking for 1 day but got sick and now refuses to quit tobacco.    Allergies   Allergen Reactions    Diphenhydramine Delirium   :      Current Outpatient Medications:     acetaminophen (TYLENOL) 325 MG tablet, Take 2 tablets by mouth Every 4 (Four) Hours As Needed for Mild Pain or Fever (temperature greater than 101F)., Disp: 90 tablet, Rfl: 1    aspirin 81 MG EC tablet, Take 1 tablet by mouth Daily., Disp: 90 tablet, Rfl: 3    atorvastatin (LIPITOR) 40 MG tablet, Take 1 tablet by mouth Every Night., Disp: 90 tablet, Rfl: 3    buPROPion SR  "(Wellbutrin SR) 150 MG 12 hr tablet, Take 1 tablet by mouth 2 (Two) Times a Day., Disp: 60 tablet, Rfl: 11    ferrous sulfate 325 (65 FE) MG tablet, Take 1 tablet by mouth Daily With Breakfast., Disp: , Rfl:     lisinopril (PRINIVIL,ZESTRIL) 5 MG tablet, Take 1 tablet by mouth Daily., Disp: 90 tablet, Rfl: 3    sildenafil (VIAGRA) 50 MG tablet, Take 1 tablet by mouth Daily As Needed for Erectile Dysfunction., Disp: 30 tablet, Rfl: 11    ticagrelor (BRILINTA) 90 MG tablet tablet, Take 1 tablet by mouth 2 (Two) Times a Day., Disp: 180 tablet, Rfl: 3    The following portions of the patient's history were reviewed and updated as appropriate: allergies, current medications, past family history, past medical history, past social history, past surgical history and problem list.    Social History     Tobacco Use    Smoking status: Every Day     Packs/day: 1.50     Years: 15.00     Pack years: 22.50     Types: Cigarettes     Passive exposure: Current    Smokeless tobacco: Never   Vaping Use    Vaping Use: Never used   Substance Use Topics    Alcohol use: Never    Drug use: Yes     Types: Marijuana         Objective   Vitals:    09/20/23 1510   BP: 115/67   BP Location: Left arm   Patient Position: Sitting   Cuff Size: Adult   Pulse: 54   SpO2: 99%   Weight: 59.4 kg (131 lb)   Height: 167.6 cm (66\")     Body mass index is 21.14 kg/m².    Constitutional:       General: Not in acute distress.     Appearance: Healthy appearance. Well-developed and not in distress. Not diaphoretic.   Eyes:      Conjunctiva/sclera: Conjunctivae normal.      Pupils: Pupils are equal, round, and reactive to light.   HENT:      Head: Normocephalic and atraumatic.   Neck:      Vascular: No carotid bruit or JVD.   Pulmonary:      Effort: Pulmonary effort is normal. No respiratory distress.      Breath sounds: Normal breath sounds.   Cardiovascular:      Normal rate. Regular rhythm.   Edema:     Peripheral edema absent.   Skin:     General: Skin is " "cool.   Neurological:      Mental Status: Alert, oriented to person, place, and time and oriented to person, place and time.       Lab Results   Component Value Date     (L) 08/10/2023    K 3.9 08/10/2023    CL 99 08/10/2023    CO2 20.2 (L) 08/10/2023    BUN 10 08/10/2023    CREATININE 0.93 08/10/2023    GLUCOSE 127 (H) 08/10/2023    CALCIUM 8.6 08/10/2023    AST 21 07/27/2023    ALT 15 07/27/2023    ALKPHOS 67 07/27/2023     No results found for: CKTOTAL  Lab Results   Component Value Date    WBC 9.42 08/10/2023    HGB 11.5 (L) 08/10/2023    HCT 33.5 (L) 08/10/2023     08/10/2023     Lab Results   Component Value Date    INR 0.89 (L) 06/12/2019     No results found for: MG  Lab Results   Component Value Date    TSH 1.250 07/27/2023    TRIG 53 08/10/2023    HDL 46 08/10/2023    LDL 45 08/10/2023      No results found for: BNP    During this visit the following were done:  Labs Reviewed []    Labs Ordered []    Radiology Reports Reviewed []    Radiology Ordered []    PCP Records Reviewed []    Referring Provider Records Reviewed []    ER Records Reviewed []    Hospital Records Reviewed []    History Obtained From Family []    Radiology Images Reviewed []    Other Reviewed []    Records Requested []       Procedures    Assessment & Plan    Diagnosis Plan   1. ASCVD (arteriosclerotic cardiovascular disease)        2. Dyslipidemia, goal LDL below 55        3. Essential hypertension                 Recommendations:  ASCVD  Recent stenting with PCI left heart catheterization reviewed.  Doing well clinically continue aspirin, Lipitor, Brilinta, and lisinopril.  Tobacco abuse  Emphasized the importance of cessation and increased risk for worsening coronary artery disease.  However he reports that he got \"nicotine sick\" and does not wish to quit due to this.  I did encourage him to continue trying and advised him of expected withdrawal symptoms.    No follow-ups on file.    As always, I appreciate very much the " opportunity to participate in the cardiovascular care of your patients.      With Best Regards,    Bob Maldonado PA-C

## 2023-10-25 DIAGNOSIS — I25.10 ASCVD (ARTERIOSCLEROTIC CARDIOVASCULAR DISEASE): Primary | Chronic | ICD-10-CM

## 2023-10-25 RX ORDER — NITROGLYCERIN 0.4 MG/1
TABLET SUBLINGUAL
Qty: 25 TABLET | Refills: 0 | Status: SHIPPED | OUTPATIENT
Start: 2023-10-25

## 2023-12-08 ENCOUNTER — OFFICE VISIT (OUTPATIENT)
Dept: CARDIOLOGY | Facility: CLINIC | Age: 48
End: 2023-12-08
Payer: COMMERCIAL

## 2023-12-08 VITALS
HEIGHT: 66 IN | OXYGEN SATURATION: 98 % | WEIGHT: 134 LBS | BODY MASS INDEX: 21.53 KG/M2 | HEART RATE: 62 BPM | SYSTOLIC BLOOD PRESSURE: 103 MMHG | DIASTOLIC BLOOD PRESSURE: 62 MMHG

## 2023-12-08 DIAGNOSIS — I10 ESSENTIAL HYPERTENSION: Chronic | ICD-10-CM

## 2023-12-08 DIAGNOSIS — F17.200 SMOKER: Chronic | ICD-10-CM

## 2023-12-08 DIAGNOSIS — I25.10 ASCVD (ARTERIOSCLEROTIC CARDIOVASCULAR DISEASE): Primary | Chronic | ICD-10-CM

## 2023-12-08 DIAGNOSIS — E78.5 DYSLIPIDEMIA, GOAL LDL BELOW 70: Chronic | ICD-10-CM

## 2023-12-08 PROCEDURE — 99214 OFFICE O/P EST MOD 30 MIN: CPT | Performed by: NURSE PRACTITIONER

## 2023-12-08 PROCEDURE — 93000 ELECTROCARDIOGRAM COMPLETE: CPT | Performed by: NURSE PRACTITIONER

## 2023-12-08 PROCEDURE — 1160F RVW MEDS BY RX/DR IN RCRD: CPT | Performed by: NURSE PRACTITIONER

## 2023-12-08 PROCEDURE — 1159F MED LIST DOCD IN RCRD: CPT | Performed by: NURSE PRACTITIONER

## 2023-12-08 PROCEDURE — 3078F DIAST BP <80 MM HG: CPT | Performed by: NURSE PRACTITIONER

## 2023-12-08 PROCEDURE — 3074F SYST BP LT 130 MM HG: CPT | Performed by: NURSE PRACTITIONER

## 2023-12-08 RX ORDER — ASPIRIN 81 MG/1
81 TABLET ORAL DAILY
Qty: 90 TABLET | Refills: 3 | Status: SHIPPED | OUTPATIENT
Start: 2023-12-08

## 2023-12-11 PROBLEM — F17.200 SMOKER: Chronic | Status: ACTIVE | Noted: 2023-12-11

## 2023-12-11 PROBLEM — F17.200 SMOKER: Status: ACTIVE | Noted: 2023-12-11

## 2023-12-11 RX ORDER — BUPROPION HYDROCHLORIDE 150 MG/1
150 TABLET, EXTENDED RELEASE ORAL DAILY
Start: 2023-12-11

## 2023-12-11 NOTE — PROGRESS NOTES
"Chief Complaint  ascvd (Chest pain , short of breath, low blood pressure)    Subjective          Rico Loaiza presents to [facility] for follow up.    History of Present Illness  Mr. Loaiza appeared at our office today, unscheduled, to ask if he needed a refill on his nitroglycerin tablets.  As he missed his appointment with Dr. Francisco last week, he was invited to stay for follow-up appointment today.    He reports that he has only used 4 of his nitroglycerin tablets from the prescription.  He has occasional chest pain that is brief, but it is associated with mild shortness of breath.  He has been unable to quit smoking.  He reports that he needs the cigarettes to help him manage his grief over his fiancée's death.    He has not been taking aspirin, \"because no one refilled it.\"  Clarified for him that dual antiplatelet therapy is a cornerstone of managing his coronary artery disease after having received a stent.  Emphasized that it is extremely important that he be compliant with this therapy.  Tobacco Use: High Risk (12/11/2023)    Patient History     Smoking Tobacco Use: Every Day     Smokeless Tobacco Use: Never     Passive Exposure: Current       Objective     Vital Signs:   /62 (BP Location: Left arm, Patient Position: Sitting, Cuff Size: Adult)   Pulse 62   Ht 167.6 cm (66\")   Wt 60.8 kg (134 lb)   SpO2 98%   BMI 21.63 kg/m²       Physical Exam  Vitals and nursing note reviewed.   Constitutional:       General: He is not in acute distress.     Comments: Smells of cigarette smoke   HENT:      Head: Atraumatic.   Neck:      Vascular: No carotid bruit.   Cardiovascular:      Rate and Rhythm: Normal rate and regular rhythm.      Heart sounds: No murmur heard.  Pulmonary:      Effort: Pulmonary effort is normal.      Breath sounds: Normal breath sounds.   Musculoskeletal:      Right lower leg: No edema.      Left lower leg: No edema.   Skin:     General: Skin is warm and " dry.   Neurological:      General: No focal deficit present.      Mental Status: He is alert.   Psychiatric:         Mood and Affect: Mood normal.          Result Review :   The following data was reviewed by: JANAY Barragan on 12/08/2023:  Common labs          7/28/2023    02:50 8/9/2023    08:31 8/10/2023    00:19   Common Labs   Glucose 97  101  127    BUN 9  15  10    Creatinine 0.91  0.96  0.93    Sodium 132  134  131    Potassium 3.9  4.1  3.9    Chloride 102  100  99    Calcium 8.5  9.3  8.6    WBC 8.59  8.69  9.42    Hemoglobin 12.3  13.2  11.5    Hematocrit 35.9  38.6  33.5    Platelets 256  263  232    Total Cholesterol 105   104    Triglycerides 65   53    HDL Cholesterol 42   46    LDL Cholesterol  49   45    Hemoglobin A1C 5.70   5.80      Lipid Panel          7/27/2023    10:25 7/28/2023    02:50 8/10/2023    00:19   Lipid Panel   Total Cholesterol 118  105  104    Triglycerides 50  65  53    HDL Cholesterol 45  42  46    VLDL Cholesterol 12  14  13    LDL Cholesterol  61  49  45    LDL/HDL Ratio 1.40  1.19  1.03      Data reviewed : Cardiology studies as detailed below      Last Cardiac Cath  Results for orders placed during the hospital encounter of 08/09/23    Intravascular Ultrasound    Narrative  Images from the original result were not included.  CARDIAC CATHETERIZATION / INTERVENTION REPORT            DATE OF PROCEDURE: 8/9/2023      INDICATION FOR PROCEDURE: Staged RCA PCI, recent LAD PCI in the setting of Angina ccs 2 and abnormal coronary CTA      PRE PROCEDURE DIAGNOSIS:    Clinical frailty: 3- Managing Well    ASA: 2=2- Mild to moderate systemic disease, medially well controlled, with no functional limitation    Mallampati: Class 2=2- Able to visualize the soft palate, fauces, uvula.  The anterior & posterior tonsilar pillars are hidden by the tongue.    POST PROCEDURE DIAGNOSIS:  CAD with RCA along multiple tandem 70 to 80% calcified stenosis status post PCI with a full metal jacket,  2.5 x 38 mm nilo point drug-eluting stent in the distal RCA followed by a 3.0 x 28 mm nilo point drug-eluting stent in the mid RCA followed by a 3.5 x 23 mm nilo point drug-eluting stent in the proximal RCA all overlapping all extensively postdilated with a 3.5 NC balloon.      Face to face mdoerate conscious  sedation time :      COMPLICATIONS : None    Specimens collected : None    PROCEDURE PERFORMED:    Selective RCA angiogram  RCA PCI  IVUS of RCA        Description of the procedure:  Prior to the procedure risk, benefits and possible alternative were discussed with the patient and informed consent was obtained. Patient was brought to cardiac cath lab table in post absorbtive state. Patient was prepped and drape in usual sterile fashion. IV Versed and Fentanyl was used for moderate sedation. 2% Lidocaine was used for topical anesthesia. R radial arterial site was prepped and a micropuncture needle was used to access the artery and a 5 F slender sheath was placed. 2.5 mg of Verapamil and 200 mcg of NTG was given through the sheath intra arterial and 5000 units of Heparin was given once the catheter crossed the aortic arch.    5 F TIG 4 catheters was used for right and left coronary angiogram and 5 F pigtail catheter was used for Left heart hemodynamics and left ventriculography. All the catheters were exchanged over 0.035 wire. The R radial arterial sheath was removed and TR band was applied and immediate and complete hemostasis was achieved. The patient tolerate the entire procedure well without any immediate known complications.          PERCUTANEOUS CORONARY INTERVENTION PROCEDURE NOTE:    Patient's RCA had a known long segment lesions all the way from the proximal to distal with multiple tandem 70 to 80% calcified stenosis.  I initially did IVUS pre and I had challenges and even getting IVUS catheter across the more proximal calcified lesion.  I also initially planned to do an IFR because of no functional  assessment available but since IVUS catheter was not able to cross even the more proximal lesion I decided to proceed with the PCI.    Heparin monotherapy was used for anticoagulation.  Total of 6000 units was given IV.  Patient also received a single bolus of Integrilin.    6 Iranian JR4 guide was used to engage the RCA coaxially.    0.014 Runthrough guidewire was used to cross the lesion and parked distally.    Used a 2.5 x 15 compliant TREK balloon to predilate the lesion at 12 patriica.  I then tried to advance a 2.5 x 30 mm stent but the stent was getting caught in the more proximal calcified lesion, I then used a 6 Iranian guide liner and with the help of Olivia I was able to deliver the stent to the more distal lesion and deployed at 14 patricia.  Steams 10 balloon was used to predilate the more proximal lesion.  I then used a 3.0 x 23 mm nilo point drug-eluting stent deployed in the mid RCA overlapping with the first stent.  The more proximal calcified lesion was more apparent and I decided to cover that given the diabetes over the extensive to 70 degree arc of calcium in that segment and again the IVUS catheter would not pass beyond this lesion this is at the takeoff of the first marginal branch.  I then used a 3.5 x 23 mm nilo point drug-eluting stent, did I was prior to the stenting and it did show medial vessel diameter of 3.5-4 in the proximal portion.  I then used a 3.5 NC balloon to post dilate and multiple segments of the overlapping stents at high pressures.      Post stent deployment angio pictures showed good expansion with 0% residual stenosis, CATALINA 3 flow in the distal vascular bed and no dissection or distal wire perforation.    Lesion length: 50 mm  Pre PCI Stenosis: 80 %  Post PCI stenosis: 0 %  Pre PCI CATALINA flow: 3  Post PCI CATALINA flow: 3    EBL: Less than 10cc          Recommendations:  Aggressive guideline directed medical management for CAD  Dual Anti platelet medication with Asa 81 mg qd and Brilinta  90 mg bid for minimum 1 year.            Felton MD [place], [facility]  Interventional Cardiology    08/09/23  11:27 EDT      Last Stress test  Results for orders placed during the hospital encounter of 05/31/23    Stress Test With Myocardial Perfusion One Day    Interpretation Summary  Images from the original result were not included.      Stress Procedure    A stress test was performed following the Ashok protocol.    Exercise duration (min) 10 min Exercise duration (sec) 6 sec Estimated workload 13.4 METS    Baseline Vitals Baseline HR 57 bpm Baseline /63 mmHg Peak Stress Vitals Peak  bpm Peak /82 mmHg Recovery Vitals Recovery HR 66 bpm Recovery BP 94/55 mmHg Exercise Data Target HR (85%) 147 bpm Max. Pred. HR (100%) 173 bpm Percent Max Pred HR 85.55 %    Patient denied any chest discomfort during exercise,    There was no ST segment deviation noted during stress.    There were no significant arrhythmias noted during stress.    Findings consistent with a normal ECG stress test.    Nuclear Perfusion Findings    Myocardial perfusion imaging indicates a normal myocardial perfusion study with no evidence of ischemia.    TID:0.99    Normal LV cavity size. Normal LV wall motion noted.    Left ventricular ejection fraction is normal (Calculated EF = 69%).    Impressions are consistent with a low risk study.       Last Echo  Results for orders placed during the hospital encounter of 05/31/23    Adult Transthoracic Echo Complete W/ Cont if Necessary Per Protocol    Interpretation Summary    Normal left ventricular cavity size and wall thickness noted. All left ventricular wall segments contract normally.    Left ventricular ejection fraction appears to be 61 - 65%.    The aortic valve is not well visualized. No aortic valve regurgitation or stenosis is present. The aortic valve is grossly normal in structure.    The mitral valve is structurally normal with no significant stenosis present. Mild mitral  valve regurgitation is present.    There is no evidence of pericardial effusion. .         ECG 12 Lead    Date/Time: 12/8/2023 11:16 AM  Performed by: Sol Agustin APRN    Authorized by: Sol Agustin APRN  Comparison: compared with previous ECG from 8/28/2023  Similar to previous ECG  Comparison to previous ECG: Sinus bradycardia 51 bpm  Rhythm: sinus bradycardia  Rate: bradycardic  BPM: 55  Conduction: conduction normal  QRS axis: normal    Clinical impression: abnormal EKG           Current Outpatient Medications   Medication Sig Dispense Refill    acetaminophen (TYLENOL) 325 MG tablet Take 2 tablets by mouth Every 4 (Four) Hours As Needed for Mild Pain or Fever (temperature greater than 101F). 90 tablet 1    aspirin 81 MG EC tablet Take 1 tablet by mouth Daily. 90 tablet 3    atorvastatin (LIPITOR) 40 MG tablet Take 1 tablet by mouth Every Night. 90 tablet 3    buPROPion SR (Wellbutrin SR) 150 MG 12 hr tablet Take 1 tablet by mouth 2 (Two) Times a Day. (Patient taking differently: Take 1 tablet by mouth Daily.) 60 tablet 11    lisinopril (PRINIVIL,ZESTRIL) 5 MG tablet Take 1 tablet by mouth Daily. 90 tablet 3    nitroglycerin (NITROSTAT) 0.4 MG SL tablet Dissolve 1 tablet under the tongue as needed for angina, may repeat every 5 minutes for up three doses. 25 tablet 0    ticagrelor (BRILINTA) 90 MG tablet tablet Take 1 tablet by mouth 2 (Two) Times a Day. 180 tablet 3     No current facility-administered medications for this visit.            Assessment and Plan    Problem List Items Addressed This Visit       ASCVD (arteriosclerotic cardiovascular disease) - Primary (Chronic)    Overview     5/31/2023 nuclear stress test: Myocardial perfusion imaging indicates a normal myocardial perfusion study with no evidence of ischemia  6/2/2023 TTE: LVEF 61 to 65%, mild MR  7/20/2023 CT angiogram the coronary arteries: Moderate stenosis are noted involving the RCA, LAD, D1, OM1, and proximal circumflex.  7/27/2023  [facility] for angina/abnormal coronary CT: PCI VITO to the LAD  8/9/2023 [facility] : Staged PCI VITO to the RCA.         Relevant Medications    aspirin 81 MG EC tablet    Dyslipidemia, goal LDL below 55 (Chronic)    Overview     7/27/2023 total cholesterol 118, triglycerides 50, HDL 45, and LDL 61  8/10/2023 total cholesterol 104,  triglycerides 53, HDL 46, and LDL 45         Essential hypertension (Chronic)    Smoker (Chronic)     Diagnoses and all orders for this visit:    1. ASCVD (arteriosclerotic cardiovascular disease) (Primary)  -     aspirin 81 MG EC tablet; Take 1 tablet by mouth Daily.  Dispense: 90 tablet; Refill: 3    2. Dyslipidemia, goal LDL below 55    3. Essential hypertension    4. Smoker    Resume aspirin therapy.  Continue statin and ACE inhibitor.  Not on beta-blocker due to bradycardia.  Follow-up with Dr. Maryam saenz.  Strongly recommended smoking cessation        Follow Up     No follow-ups on file.    Patient was given instructions and counseling regarding his condition or for health maintenance advice. Please see specific information pulled into the AVS if appropriate.       Electronically signed by JANAY Barragan, 12/11/23, 11:16 AM EST.      Dictated Utilizing Dragon Dictation: Part of this note may be an electronic transcription/translation of spoken language to printed text using the Dragon Dictation System

## 2024-01-17 ENCOUNTER — OFFICE VISIT (OUTPATIENT)
Age: 49
End: 2024-01-17
Payer: COMMERCIAL

## 2024-01-17 VITALS
OXYGEN SATURATION: 98 % | DIASTOLIC BLOOD PRESSURE: 83 MMHG | HEART RATE: 76 BPM | BODY MASS INDEX: 21.05 KG/M2 | WEIGHT: 131 LBS | SYSTOLIC BLOOD PRESSURE: 139 MMHG | HEIGHT: 66 IN

## 2024-01-17 DIAGNOSIS — E78.5 DYSLIPIDEMIA, GOAL LDL BELOW 70: Chronic | ICD-10-CM

## 2024-01-17 DIAGNOSIS — R07.2 PRECORDIAL PAIN: ICD-10-CM

## 2024-01-17 DIAGNOSIS — Z78.9 STATIN INTOLERANCE: ICD-10-CM

## 2024-01-17 DIAGNOSIS — I25.10 ASCVD (ARTERIOSCLEROTIC CARDIOVASCULAR DISEASE): Primary | Chronic | ICD-10-CM

## 2024-01-17 DIAGNOSIS — I10 ESSENTIAL HYPERTENSION: Chronic | ICD-10-CM

## 2024-01-17 PROCEDURE — 3075F SYST BP GE 130 - 139MM HG: CPT | Performed by: INTERNAL MEDICINE

## 2024-01-17 PROCEDURE — 99213 OFFICE O/P EST LOW 20 MIN: CPT | Performed by: INTERNAL MEDICINE

## 2024-01-17 PROCEDURE — 3079F DIAST BP 80-89 MM HG: CPT | Performed by: INTERNAL MEDICINE

## 2024-01-17 RX ORDER — ATORVASTATIN CALCIUM 20 MG/1
20 TABLET, FILM COATED ORAL NIGHTLY
Qty: 90 TABLET | Refills: 3 | Status: SHIPPED | OUTPATIENT
Start: 2024-01-17

## 2024-01-17 NOTE — PROGRESS NOTES
[facility] CARDIOLOGY  [address]  [place] [address]  Phone: [phone]  Fax: [phone]    01/17/2024    No chief complaint on file.       History:   Rico Loaiza is a 48 y.o. male seen in followup,       Past Medical History:   Diagnosis Date    CAD (coronary artery disease), native coronary artery 07/27/2023    Essential hypertension 8/28/2023    Hyperlipidemia     Infectious viral hepatitis        Past Surgical History:   Procedure Laterality Date    CARDIAC CATHETERIZATION      CARDIAC CATHETERIZATION N/A 07/27/2023    Procedure: Coronary angiography;  Surgeon: Felton Mcgovern MD;  Location:  [facility] CATH INVASIVE LOCATION;  Service: Cardiology;  Laterality: N/A;    CARDIAC CATHETERIZATION N/A 8/9/2023    Procedure: Percutaneous Coronary Intervention RCA;  Surgeon: Felton Mcgovern MD;  Location:  [facility] CATH INVASIVE LOCATION;  Service: Cardiology;  Laterality: N/A;    CORONARY ANGIOPLASTY      HERNIA REPAIR      HERNIA REPAIR      INTERVENTIONAL RADIOLOGY PROCEDURE N/A 8/9/2023    Procedure: Intravascular Ultrasound;  Surgeon: Felton Mcgovern MD;  Location:  [facility] CATH INVASIVE LOCATION;  Service: Cardiology;  Laterality: N/A;        Past Social History:  Social History     Socioeconomic History    Marital status: Single   Tobacco Use    Smoking status: Every Day     Packs/day: 1.50     Years: 15.00     Additional pack years: 0.00     Total pack years: 22.50     Types: Cigarettes     Passive exposure: Current    Smokeless tobacco: Never   Vaping Use    Vaping Use: Never used   Substance and Sexual Activity    Alcohol use: Never    Drug use: Yes     Types: Marijuana    Sexual activity: Never       Past Family History:  Family History   Problem Relation Age of Onset    Hypertension Mother     Diabetes Mother     Cancer Mother     Irritable bowel syndrome Mother     Heart disease Father     Heart attack Father     Alcohol abuse  "Sister     COPD Paternal Aunt     Cancer Paternal Aunt     Alcohol abuse Paternal Uncle     Colon cancer Paternal Grandfather     Colon polyps Paternal Grandfather        Review of Systems:   ROS       Current Outpatient Medications   Medication Sig Dispense Refill    acetaminophen (TYLENOL) 325 MG tablet Take 2 tablets by mouth Every 4 (Four) Hours As Needed for Mild Pain or Fever (temperature greater than 101F). 90 tablet 1    aspirin 81 MG EC tablet Take 1 tablet by mouth Daily. 90 tablet 3    atorvastatin (LIPITOR) 40 MG tablet Take 1 tablet by mouth Every Night. 90 tablet 3    buPROPion SR (Wellbutrin SR) 150 MG 12 hr tablet Take 1 tablet by mouth Daily.      lisinopril (PRINIVIL,ZESTRIL) 5 MG tablet Take 1 tablet by mouth Daily. 90 tablet 3    nitroglycerin (NITROSTAT) 0.4 MG SL tablet Dissolve 1 tablet under the tongue as needed for angina, may repeat every 5 minutes for up three doses. 25 tablet 0    ticagrelor (BRILINTA) 90 MG tablet tablet Take 1 tablet by mouth 2 (Two) Times a Day. 180 tablet 3     No current facility-administered medications for this visit.        Allergies   Allergen Reactions    Diphenhydramine Delirium       Objective     /83 (BP Location: Left arm, Patient Position: Sitting, Cuff Size: Adult)   Pulse 76   Ht 167.6 cm (66\")   Wt 59.4 kg (131 lb)   SpO2 98%   BMI 21.14 kg/m²     Physical Exam       DATA:   Results for orders placed during the hospital encounter of 05/31/23    Adult Transthoracic Echo Complete W/ Cont if Necessary Per Protocol    Interpretation Summary    Normal left ventricular cavity size and wall thickness noted. All left ventricular wall segments contract normally.    Left ventricular ejection fraction appears to be 61 - 65%.    The aortic valve is not well visualized. No aortic valve regurgitation or stenosis is present. The aortic valve is grossly normal in structure.    The mitral valve is structurally normal with no significant stenosis present. Mild " mitral valve regurgitation is present.    There is no evidence of pericardial effusion. .   Results for orders placed during the hospital encounter of 05/31/23    Stress Test With Myocardial Perfusion One Day    Interpretation Summary  Images from the original result were not included.      Stress Procedure    A stress test was performed following the Ashok protocol.    Exercise duration (min) 10 min Exercise duration (sec) 6 sec Estimated workload 13.4 METS    Baseline Vitals Baseline HR 57 bpm Baseline /63 mmHg Peak Stress Vitals Peak  bpm Peak /82 mmHg Recovery Vitals Recovery HR 66 bpm Recovery BP 94/55 mmHg Exercise Data Target HR (85%) 147 bpm Max. Pred. HR (100%) 173 bpm Percent Max Pred HR 85.55 %    Patient denied any chest discomfort during exercise,    There was no ST segment deviation noted during stress.    There were no significant arrhythmias noted during stress.    Findings consistent with a normal ECG stress test.    Nuclear Perfusion Findings    Myocardial perfusion imaging indicates a normal myocardial perfusion study with no evidence of ischemia.    TID:0.99    Normal LV cavity size. Normal LV wall motion noted.    Left ventricular ejection fraction is normal (Calculated EF = 69%).    Impressions are consistent with a low risk study.   Results for orders placed during the hospital encounter of 05/31/23    Stress Test With Myocardial Perfusion One Day    Interpretation Summary  Images from the original result were not included.      Stress Procedure    A stress test was performed following the Ashok protocol.    Exercise duration (min) 10 min Exercise duration (sec) 6 sec Estimated workload 13.4 METS    Baseline Vitals Baseline HR 57 bpm Baseline /63 mmHg Peak Stress Vitals Peak  bpm Peak /82 mmHg Recovery Vitals Recovery HR 66 bpm Recovery BP 94/55 mmHg Exercise Data Target HR (85%) 147 bpm Max. Pred. HR (100%) 173 bpm Percent Max Pred HR 85.55 %    Patient  denied any chest discomfort during exercise,    There was no ST segment deviation noted during stress.    There were no significant arrhythmias noted during stress.    Findings consistent with a normal ECG stress test.    Nuclear Perfusion Findings    Myocardial perfusion imaging indicates a normal myocardial perfusion study with no evidence of ischemia.    TID:0.99    Normal LV cavity size. Normal LV wall motion noted.    Left ventricular ejection fraction is normal (Calculated EF = 69%).    Impressions are consistent with a low risk study.   Results for orders placed during the hospital encounter of 08/09/23    Intravascular Ultrasound    Narrative  Images from the original result were not included.  CARDIAC CATHETERIZATION / INTERVENTION REPORT            DATE OF PROCEDURE: 8/9/2023      INDICATION FOR PROCEDURE: Staged RCA PCI, recent LAD PCI in the setting of Angina ccs 2 and abnormal coronary CTA      PRE PROCEDURE DIAGNOSIS:    Clinical frailty: 3- Managing Well    ASA: 2=2- Mild to moderate systemic disease, medially well controlled, with no functional limitation    Mallampati: Class 2=2- Able to visualize the soft palate, fauces, uvula.  The anterior & posterior tonsilar pillars are hidden by the tongue.    POST PROCEDURE DIAGNOSIS:  CAD with RCA along multiple tandem 70 to 80% calcified stenosis status post PCI with a full metal jacket, 2.5 x 38 mm nilo point drug-eluting stent in the distal RCA followed by a 3.0 x 28 mm nilo point drug-eluting stent in the mid RCA followed by a 3.5 x 23 mm nilo point drug-eluting stent in the proximal RCA all overlapping all extensively postdilated with a 3.5 NC balloon.      Face to face mdoerate conscious  sedation time :      COMPLICATIONS : None    Specimens collected : None    PROCEDURE PERFORMED:    Selective RCA angiogram  RCA PCI  IVUS of RCA        Description of the procedure:  Prior to the procedure risk, benefits and possible alternative were discussed with the  patient and informed consent was obtained. Patient was brought to cardiac cath lab table in post absorbtive state. Patient was prepped and drape in usual sterile fashion. IV Versed and Fentanyl was used for moderate sedation. 2% Lidocaine was used for topical anesthesia. R radial arterial site was prepped and a micropuncture needle was used to access the artery and a 5 F slender sheath was placed. 2.5 mg of Verapamil and 200 mcg of NTG was given through the sheath intra arterial and 5000 units of Heparin was given once the catheter crossed the aortic arch.    5 F TIG 4 catheters was used for right and left coronary angiogram and 5 F pigtail catheter was used for Left heart hemodynamics and left ventriculography. All the catheters were exchanged over 0.035 wire. The R radial arterial sheath was removed and TR band was applied and immediate and complete hemostasis was achieved. The patient tolerate the entire procedure well without any immediate known complications.          PERCUTANEOUS CORONARY INTERVENTION PROCEDURE NOTE:    Patient's RCA had a known long segment lesions all the way from the proximal to distal with multiple tandem 70 to 80% calcified stenosis.  I initially did IVUS pre and I had challenges and even getting IVUS catheter across the more proximal calcified lesion.  I also initially planned to do an IFR because of no functional assessment available but since IVUS catheter was not able to cross even the more proximal lesion I decided to proceed with the PCI.    Heparin monotherapy was used for anticoagulation.  Total of 6000 units was given IV.  Patient also received a single bolus of Integrilin.    6 Syrian JR4 guide was used to engage the RCA coaxially.    0.014 Runthrough guidewire was used to cross the lesion and parked distally.    Used a 2.5 x 15 compliant TREK balloon to predilate the lesion at 12 patricia.  I then tried to advance a 2.5 x 30 mm stent but the stent was getting caught in the more  proximal calcified lesion, I then used a 6 Czech guide liner and with the help of Olivia I was able to deliver the stent to the more distal lesion and deployed at 14 patricia.  Steams 10 balloon was used to predilate the more proximal lesion.  I then used a 3.0 x 23 mm nilo point drug-eluting stent deployed in the mid RCA overlapping with the first stent.  The more proximal calcified lesion was more apparent and I decided to cover that given the diabetes over the extensive to 70 degree arc of calcium in that segment and again the IVUS catheter would not pass beyond this lesion this is at the takeoff of the first marginal branch.  I then used a 3.5 x 23 mm nilo point drug-eluting stent, did I was prior to the stenting and it did show medial vessel diameter of 3.5-4 in the proximal portion.  I then used a 3.5 NC balloon to post dilate and multiple segments of the overlapping stents at high pressures.      Post stent deployment angio pictures showed good expansion with 0% residual stenosis, CATALINA 3 flow in the distal vascular bed and no dissection or distal wire perforation.    Lesion length: 50 mm  Pre PCI Stenosis: 80 %  Post PCI stenosis: 0 %  Pre PCI CATALINA flow: 3  Post PCI CATALINA flow: 3    EBL: Less than 10cc          Recommendations:  Aggressive guideline directed medical management for CAD  Dual Anti platelet medication with Asa 81 mg qd and Brilinta 90 mg bid for minimum 1 year.            Felton Mcgovern MD, [facility]  Interventional Cardiology    08/09/23  11:27 EDT    Procedures     Lab Results   Component Value Date    CHOL 104 08/10/2023    CHOL 105 07/28/2023    CHOL 118 07/27/2023     Lab Results   Component Value Date    TRIG 53 08/10/2023    TRIG 65 07/28/2023    TRIG 50 07/27/2023     Lab Results   Component Value Date    HDL 46 08/10/2023    HDL 42 07/28/2023    HDL 45 07/27/2023     Lab Results   Component Value Date    LDL 45 08/10/2023    LDL 49 07/28/2023    LDL 61 07/27/2023       Lab Results   Component  "Value Date    TSH 1.250 07/27/2023               Invalid input(s): \"LABALBU\", \"PROT\"        Assessment and Plan    There are no diagnoses linked to this encounter.          Recommended increase activity to 30 minutes of walking daily, most days of the week.      No follow-ups on file.    Thank you for allowing me to participate in the care of Rico Loaiza. Feel free to contact me directly with any further questions or concerns.          Felton Mcgovern MD, FACC  Interventional Cardiology    "

## 2024-01-25 DIAGNOSIS — I25.10 ASCVD (ARTERIOSCLEROTIC CARDIOVASCULAR DISEASE): Chronic | ICD-10-CM

## 2024-01-25 RX ORDER — LISINOPRIL 5 MG/1
5 TABLET ORAL DAILY
Qty: 90 TABLET | Refills: 3 | Status: SHIPPED | OUTPATIENT
Start: 2024-01-25

## 2024-01-25 NOTE — TELEPHONE ENCOUNTER
Patient said that he is almost out of his medications and needs them sent in to a different pharmacy. He needs them sent in to [facility] on [address] in [place].     Brilinta 90mg.     Lisinopril 5mg     Aspirin 81mg.     Thanks!

## 2024-01-26 RX ORDER — ASPIRIN 81 MG/1
81 TABLET ORAL DAILY
Qty: 90 TABLET | Refills: 3 | Status: SHIPPED | OUTPATIENT
Start: 2024-01-26

## 2024-01-29 ENCOUNTER — OFFICE VISIT (OUTPATIENT)
Dept: CARDIOLOGY | Facility: CLINIC | Age: 49
End: 2024-01-29
Payer: COMMERCIAL

## 2024-01-29 ENCOUNTER — DISEASE STATE MANAGEMENT VISIT (OUTPATIENT)
Dept: PHARMACY | Facility: HOSPITAL | Age: 49
End: 2024-01-29
Payer: COMMERCIAL

## 2024-01-29 VITALS
HEIGHT: 66 IN | BODY MASS INDEX: 21.47 KG/M2 | WEIGHT: 133.6 LBS | DIASTOLIC BLOOD PRESSURE: 83 MMHG | OXYGEN SATURATION: 97 % | SYSTOLIC BLOOD PRESSURE: 132 MMHG | HEART RATE: 93 BPM

## 2024-01-29 DIAGNOSIS — I25.10 ASCVD (ARTERIOSCLEROTIC CARDIOVASCULAR DISEASE): Primary | Chronic | ICD-10-CM

## 2024-01-29 DIAGNOSIS — E78.5 DYSLIPIDEMIA, GOAL LDL BELOW 70: Chronic | ICD-10-CM

## 2024-01-29 DIAGNOSIS — I25.10 ASCVD (ARTERIOSCLEROTIC CARDIOVASCULAR DISEASE): Chronic | ICD-10-CM

## 2024-01-29 PROCEDURE — 3075F SYST BP GE 130 - 139MM HG: CPT | Performed by: PHYSICIAN ASSISTANT

## 2024-01-29 PROCEDURE — 3079F DIAST BP 80-89 MM HG: CPT | Performed by: PHYSICIAN ASSISTANT

## 2024-01-29 PROCEDURE — 99213 OFFICE O/P EST LOW 20 MIN: CPT | Performed by: PHYSICIAN ASSISTANT

## 2024-01-29 RX ORDER — VARENICLINE TARTRATE 0.5 (11)-1
KIT ORAL
Qty: 1 EACH | Refills: 0 | Status: SHIPPED | OUTPATIENT
Start: 2024-01-29 | End: 2024-02-26

## 2024-01-29 RX ORDER — VARENICLINE TARTRATE 1 MG/1
TABLET, FILM COATED ORAL
Qty: 180 TABLET | OUTPATIENT
Start: 2024-01-29

## 2024-01-29 RX ORDER — NITROGLYCERIN 0.4 MG/1
TABLET SUBLINGUAL
Qty: 25 TABLET | Refills: 0 | Status: SHIPPED | OUTPATIENT
Start: 2024-01-29

## 2024-01-29 RX ORDER — VARENICLINE TARTRATE 1 MG/1
1 TABLET, FILM COATED ORAL 2 TIMES DAILY
Qty: 56 TABLET | Refills: 1 | Status: SHIPPED | OUTPATIENT
Start: 2024-02-26 | End: 2024-04-22

## 2024-01-29 NOTE — PROGRESS NOTES
Fany Rocha APRN  Rico Loaiza  1975  01/29/2024    Patient Active Problem List   Diagnosis    Precordial pain    ASCVD (arteriosclerotic cardiovascular disease)    Dyslipidemia, goal LDL below 55    Essential hypertension    Smoker    Statin intolerance       Dear Fany Rocha APRN:    Subjective     History of Present Illness:    Chief Complaint   Patient presents with    Follow-up     routine     Coronary artery disease  5/31/2023 nuclear stress test showing normal myocardial perfusion with no evidence of ischemia  CT coronary angiogram 7/20/2023: Moderate stenosis involving RCA LAD and Lcx  [facility] 7/27/2023: PCI to the LAD  [facility] 8/9/2023: Staged PCI to the RCA        Rico Loaiza is a pleasant 48 y.o. male with a past medical history significant for coronary artery disease status post stenting of the LAD on 7/27/2023 with staged PCI of the RCA on 8/9/2023.  Patient also has lifelong history of tobacco abuse, Essential hypertension, dyslipidemia.  He comes in today for cardiology follow-up.     Dario reports he has been doing excellent since he was last seen he is interested in trying Chantix for help in quitting tobacco.  Clinically he denies any chest pains, shortness of breath, palpitations, dizziness, or syncope blood pressure is currently controlled he reports that he is taking both aspirin and Brilinta without any bleeding issues.      Allergies   Allergen Reactions    Diphenhydramine Delirium   :      Current Outpatient Medications:     acetaminophen (TYLENOL) 325 MG tablet, Take 2 tablets by mouth Every 4 (Four) Hours As Needed for Mild Pain or Fever (temperature greater than 101F)., Disp: 90 tablet, Rfl: 1    aspirin 81 MG EC tablet, Take 1 tablet by mouth Daily., Disp: 90 tablet, Rfl: 3    atorvastatin (LIPITOR) 20 MG tablet, Take 1 tablet by mouth Every Night., Disp: 90 tablet, Rfl: 3    buPROPion SR (Wellbutrin SR) 150 MG 12 hr tablet, Take 1 tablet by mouth Daily., Disp: , Rfl:  "    lisinopril (PRINIVIL,ZESTRIL) 5 MG tablet, Take 1 tablet by mouth Daily., Disp: 90 tablet, Rfl: 3    nitroglycerin (NITROSTAT) 0.4 MG SL tablet, Dissolve 1 tablet under the tongue as needed for angina, may repeat every 5 minutes for up three doses., Disp: 25 tablet, Rfl: 0    ticagrelor (BRILINTA) 90 MG tablet tablet, Take 1 tablet by mouth 2 (Two) Times a Day., Disp: 180 tablet, Rfl: 3    The following portions of the patient's history were reviewed and updated as appropriate: allergies, current medications, past family history, past medical history, past social history, past surgical history and problem list.    Social History     Tobacco Use    Smoking status: Every Day     Packs/day: 1.50     Years: 15.00     Additional pack years: 0.00     Total pack years: 22.50     Types: Cigarettes     Passive exposure: Current    Smokeless tobacco: Never   Vaping Use    Vaping Use: Never used   Substance Use Topics    Alcohol use: Never    Drug use: Yes     Types: Marijuana         Objective   Vitals:    01/29/24 1139   Weight: 60.6 kg (133 lb 9.6 oz)   Height: 167.6 cm (66\")     Body mass index is 21.56 kg/m².    ROS    Constitutional:       General: Not in acute distress.     Appearance: Healthy appearance. Well-developed and not in distress. Not diaphoretic.   Eyes:      Conjunctiva/sclera: Conjunctivae normal.      Pupils: Pupils are equal, round, and reactive to light.   HENT:      Head: Normocephalic and atraumatic.   Neck:      Vascular: No carotid bruit or JVD.   Pulmonary:      Effort: Pulmonary effort is normal. No respiratory distress.      Breath sounds: Normal breath sounds.   Cardiovascular:      Normal rate. Regular rhythm.   Edema:     Peripheral edema absent.   Skin:     General: Skin is cool.   Neurological:      Mental Status: Alert, oriented to person, place, and time and oriented to person, place and time.         Lab Results   Component Value Date     (L) 08/10/2023    K 3.9 08/10/2023    CL " "99 08/10/2023    CO2 20.2 (L) 08/10/2023    BUN 10 08/10/2023    CREATININE 0.93 08/10/2023    GLUCOSE 127 (H) 08/10/2023    CALCIUM 8.6 08/10/2023    AST 21 07/27/2023    ALT 15 07/27/2023    ALKPHOS 67 07/27/2023     No results found for: \"CKTOTAL\"  Lab Results   Component Value Date    WBC 9.42 08/10/2023    HGB 11.5 (L) 08/10/2023    HCT 33.5 (L) 08/10/2023     08/10/2023     Lab Results   Component Value Date    INR 0.89 (L) 06/12/2019     No results found for: \"MG\"  Lab Results   Component Value Date    TSH 1.250 07/27/2023    TRIG 53 08/10/2023    HDL 46 08/10/2023    LDL 45 08/10/2023      No results found for: \"BNP\"    During this visit the following were done:  Labs Reviewed []    Labs Ordered []    Radiology Reports Reviewed []    Radiology Ordered []    PCP Records Reviewed []    Referring Provider Records Reviewed []    ER Records Reviewed []    Hospital Records Reviewed []    History Obtained From Family []    Radiology Images Reviewed []    Other Reviewed []    Records Requested []       Procedures    Assessment & Plan   No diagnosis found.         Recommendations:  CAD  Denies any anginal symptoms.  Continue aspirin, Brilinta, lisinopril.  Tobacco abuse  He is interested in quitting and requested prescription of Chantix I did provide this for him.  Discussed possible adverse reaction with vivid dreams.    No follow-ups on file.    As always, I appreciate very much the opportunity to participate in the cardiovascular care of your patients.      With Best Regards,    Bob Maldonado PA-C          "

## 2024-01-29 NOTE — PROGRESS NOTES
[facility]  Lipid Management Program - Leqvio (Inclisiran)     Rico Loaiza is a 48 y.o. male referred to the Medication Management Clinic by Dr. Francisco for clinical pharmacy and specialty pharmacy management of [facility].  Rico Loaiza is  treated for ASCVD and currently takes atorvastatin 20mg.  In the past, Pt has tried atorvastatin 40mg but could not tolerate higher dose due to muscle pain. Dr. Francisco decreased this dose about 1 week ago.     Relevant Past Medical History and Comorbidities  Past Medical History:   Diagnosis Date    CAD (coronary artery disease), native coronary artery 07/27/2023    Essential hypertension 8/28/2023    Hyperlipidemia     Infectious viral hepatitis      Social History     Socioeconomic History    Marital status: Single   Tobacco Use    Smoking status: Every Day     Packs/day: 1.50     Years: 15.00     Additional pack years: 0.00     Total pack years: 22.50     Types: Cigarettes     Passive exposure: Current    Smokeless tobacco: Never   Vaping Use    Vaping Use: Never used   Substance and Sexual Activity    Alcohol use: Never    Drug use: Yes     Types: Marijuana    Sexual activity: Never       Allergies  Diphenhydramine    Current Medication List    Current Outpatient Medications:     acetaminophen (TYLENOL) 325 MG tablet, Take 2 tablets by mouth Every 4 (Four) Hours As Needed for Mild Pain or Fever (temperature greater than 101F)., Disp: 90 tablet, Rfl: 1    aspirin 81 MG EC tablet, Take 1 tablet by mouth Daily., Disp: 90 tablet, Rfl: 3    atorvastatin (LIPITOR) 20 MG tablet, Take 1 tablet by mouth Every Night., Disp: 90 tablet, Rfl: 3    Inclisiran Sodium 284 MG/1.5ML solution prefilled syringe, Inject 1.5 mL under the skin into the appropriate area as directed Every 3 (Three) Months., Disp: 1.5 mL, Rfl: 1    lisinopril (PRINIVIL,ZESTRIL) 5 MG tablet, Take 1 tablet by mouth Daily., Disp: 90 tablet, Rfl: 3    nitroglycerin (NITROSTAT) 0.4 MG SL tablet,  Dissolve 1 tablet under the tongue as needed for angina, may repeat every 5 minutes for up three doses., Disp: 25 tablet, Rfl: 0    ticagrelor (BRILINTA) 90 MG tablet tablet, Take 1 tablet by mouth 2 (Two) Times a Day., Disp: 180 tablet, Rfl: 3    buPROPion SR (Wellbutrin SR) 150 MG 12 hr tablet, Take 1 tablet by mouth Daily. (Patient not taking: Reported on 1/29/2024), Disp: , Rfl:     [START ON 2/26/2024] varenicline (Chantix Continuing Month Festus) 1 MG tablet, Take 1 tablet by mouth 2 (Two) Times a Day for 56 days. (Patient not taking: Reported on 1/29/2024), Disp: 56 tablet, Rfl: 1    Varenicline Tartrate, Starter, 0.5 MG X 11 & 1 MG X 42 tablet therapy pack, Take 0.5 mg by mouth Daily for 3 days, THEN 0.5 mg 2 (Two) Times a Day for 4 days, THEN 1 mg 2 (Two) Times a Day for 21 days. Take 0.5 mg po daily x 3 days, then 0.5 mg po bid x 4 days, then 1 mg po bid (Patient not taking: Reported on 1/29/2024), Disp: 1 each, Rfl: 0    Drug Interactions  None with Leqvio     Relevant Laboratory Values  Lab Results   Component Value Date    CHOL 104 08/10/2023    TRIG 53 08/10/2023    HDL 46 08/10/2023    LDL 45 08/10/2023       Medication Assessment & Plan    Administered Leqvio 284mg SUBQ in back of right arm.   Medication education provided, including:   Common Adverse Effects: Injection site reactions, arthralgias, & bronchitis.  Potential for allergic reaction.  Go to ED/call 911 with any S/Sx of allergic reaction.   Must be administered by a HCP.  If a dose is missed, please call to reschedule.   Expect LDL reduction, to help reduce cardiovascular risk.   At each visit, patient will need to stay a minimum of 15 min for monitoring   Lipid panel will be checked 4 to 12 weeks after starting therapy, order placed.   Patient will continue regular follow-up with cardiology  Will follow up in 3 months for next injection.     Farzana Anthony, PharmD  1/29/2024  13:15 EST

## 2024-03-05 ENCOUNTER — TRANSCRIBE ORDERS (OUTPATIENT)
Dept: ADMINISTRATIVE | Facility: HOSPITAL | Age: 49
End: 2024-03-05
Payer: COMMERCIAL

## 2024-03-05 DIAGNOSIS — M47.816 SPONDYLOSIS WITHOUT MYELOPATHY OR RADICULOPATHY, LUMBAR REGION: Primary | ICD-10-CM

## 2024-04-11 ENCOUNTER — OFFICE VISIT (OUTPATIENT)
Dept: CARDIOLOGY | Facility: CLINIC | Age: 49
End: 2024-04-11
Payer: COMMERCIAL

## 2024-04-11 VITALS
HEIGHT: 66 IN | DIASTOLIC BLOOD PRESSURE: 68 MMHG | BODY MASS INDEX: 21.21 KG/M2 | OXYGEN SATURATION: 98 % | WEIGHT: 132 LBS | HEART RATE: 61 BPM | SYSTOLIC BLOOD PRESSURE: 120 MMHG

## 2024-04-11 DIAGNOSIS — E78.5 DYSLIPIDEMIA, GOAL LDL BELOW 70: Chronic | ICD-10-CM

## 2024-04-11 DIAGNOSIS — K21.9 CHEST PAIN DUE TO GERD: Primary | ICD-10-CM

## 2024-04-11 DIAGNOSIS — I10 ESSENTIAL HYPERTENSION: Chronic | ICD-10-CM

## 2024-04-11 DIAGNOSIS — F17.200 SMOKER: Chronic | ICD-10-CM

## 2024-04-11 DIAGNOSIS — R07.9 CHEST PAIN DUE TO GERD: Primary | ICD-10-CM

## 2024-04-11 DIAGNOSIS — I25.10 ASCVD (ARTERIOSCLEROTIC CARDIOVASCULAR DISEASE): Chronic | ICD-10-CM

## 2024-04-11 PROCEDURE — 3078F DIAST BP <80 MM HG: CPT | Performed by: NURSE PRACTITIONER

## 2024-04-11 PROCEDURE — 3074F SYST BP LT 130 MM HG: CPT | Performed by: NURSE PRACTITIONER

## 2024-04-11 PROCEDURE — 1160F RVW MEDS BY RX/DR IN RCRD: CPT | Performed by: NURSE PRACTITIONER

## 2024-04-11 PROCEDURE — 99214 OFFICE O/P EST MOD 30 MIN: CPT | Performed by: NURSE PRACTITIONER

## 2024-04-11 PROCEDURE — 1159F MED LIST DOCD IN RCRD: CPT | Performed by: NURSE PRACTITIONER

## 2024-04-11 RX ORDER — PANTOPRAZOLE SODIUM 40 MG/1
40 TABLET, DELAYED RELEASE ORAL DAILY
Qty: 30 TABLET | Refills: 0 | Status: SHIPPED | OUTPATIENT
Start: 2024-04-11

## 2024-04-11 NOTE — PROGRESS NOTES
"Chief Complaint  Follow-up (Pt denies CP, some SOA,chronic fatigue. Complains of insomnia, hiccups and heartburn.) and Med Review (Tolerating all current medications.)    Subjective          Rico Loaiza presents to [facility] CARDIOLOGY for follow up.    History of Present Illness    Mr. Loaiza presents for follow-up of ASCVD, hypertension, hyperlipidemia, and smoking.  He was last seen in our clinic 12/08/2023.  At that time it was recommended that he resume his dual antiplatelet therapy.  We have not been able to use beta-blockers due to history of bradycardia.  He saw Bob Maldonado PA-C 01/29/2024 and was willing to try Chantix for smoking cessation.  He has not been able to discontinue smoking and does not wish to have a refill on the Chantix.    His blood pressure is at goal today.  He does not check at home, but denies headache, chest pain, vision change that would indicate poor control.    He has not had any labs recently to evaluate for lipid control.    He has begun to wonder if some of his chest pain might actually be indigestion.  Will proceed with trial of PPI inhibitor  Tobacco Use: High Risk (4/28/2024)    Patient History     Smoking Tobacco Use: Every Day     Smokeless Tobacco Use: Never     Passive Exposure: Current       Objective     Vital Signs:   /68 (BP Location: Left arm, Patient Position: Sitting, Cuff Size: Adult)   Pulse 61   Ht 167.6 cm (66\")   Wt 59.9 kg (132 lb)   SpO2 98%   BMI 21.31 kg/m²       Physical Exam  Vitals and nursing note reviewed.   Constitutional:       General: He is not in acute distress.     Appearance: He is normal weight.   HENT:      Head: Normocephalic and atraumatic.   Eyes:      Conjunctiva/sclera: Conjunctivae normal.   Neck:      Vascular: No carotid bruit.   Cardiovascular:      Rate and Rhythm: Normal rate and regular rhythm.      Pulses: Normal pulses.   Pulmonary:      Effort: Pulmonary effort is normal.      Breath sounds: " Normal breath sounds.   Musculoskeletal:      Cervical back: Neck supple.      Right lower leg: No edema.      Left lower leg: No edema.   Skin:     General: Skin is warm and dry.   Neurological:      General: No focal deficit present.   Psychiatric:         Mood and Affect: Mood normal.         Behavior: Behavior normal.          Result Review :            Last Cardiac Cath  Results for orders placed during the hospital encounter of 08/09/23    Intravascular Ultrasound    Conclusion  Images from the original result were not included.  CARDIAC CATHETERIZATION / INTERVENTION REPORT            DATE OF PROCEDURE: 8/9/2023      INDICATION FOR PROCEDURE: Staged RCA PCI, recent LAD PCI in the setting of Angina ccs 2 and abnormal coronary CTA      PRE PROCEDURE DIAGNOSIS:    Clinical frailty: 3- Managing Well    ASA: 2=2- Mild to moderate systemic disease, medially well controlled, with no functional limitation    Mallampati: Class 2=2- Able to visualize the soft palate, fauces, uvula.  The anterior & posterior tonsilar pillars are hidden by the tongue.    POST PROCEDURE DIAGNOSIS:  CAD with RCA along multiple tandem 70 to 80% calcified stenosis status post PCI with a full metal jacket, 2.5 x 38 mm nilo point drug-eluting stent in the distal RCA followed by a 3.0 x 28 mm nilo point drug-eluting stent in the mid RCA followed by a 3.5 x 23 mm nilo point drug-eluting stent in the proximal RCA all overlapping all extensively postdilated with a 3.5 NC balloon.      Face to face mdoerate conscious  sedation time :      COMPLICATIONS : None    Specimens collected : None    PROCEDURE PERFORMED:    Selective RCA angiogram  RCA PCI  IVUS of RCA        Description of the procedure:  Prior to the procedure risk, benefits and possible alternative were discussed with the patient and informed consent was obtained. Patient was brought to cardiac cath lab table in post absorbtive state. Patient was prepped and drape in usual sterile fashion.  IV Versed and Fentanyl was used for moderate sedation. 2% Lidocaine was used for topical anesthesia. R radial arterial site was prepped and a micropuncture needle was used to access the artery and a 5 F slender sheath was placed. 2.5 mg of Verapamil and 200 mcg of NTG was given through the sheath intra arterial and 5000 units of Heparin was given once the catheter crossed the aortic arch.    5 F TIG 4 catheters was used for right and left coronary angiogram and 5 F pigtail catheter was used for Left heart hemodynamics and left ventriculography. All the catheters were exchanged over 0.035 wire. The R radial arterial sheath was removed and TR band was applied and immediate and complete hemostasis was achieved. The patient tolerate the entire procedure well without any immediate known complications.          PERCUTANEOUS CORONARY INTERVENTION PROCEDURE NOTE:    Patient's RCA had a known long segment lesions all the way from the proximal to distal with multiple tandem 70 to 80% calcified stenosis.  I initially did IVUS pre and I had challenges and even getting IVUS catheter across the more proximal calcified lesion.  I also initially planned to do an IFR because of no functional assessment available but since IVUS catheter was not able to cross even the more proximal lesion I decided to proceed with the PCI.    Heparin monotherapy was used for anticoagulation.  Total of 6000 units was given IV.  Patient also received a single bolus of Integrilin.    6 Amharic JR4 guide was used to engage the RCA coaxially.    0.014 Runthrough guidewire was used to cross the lesion and parked distally.    Used a 2.5 x 15 compliant TREK balloon to predilate the lesion at 12 patricia.  I then tried to advance a 2.5 x 30 mm stent but the stent was getting caught in the more proximal calcified lesion, I then used a 6 Amharic guide liner and with the help of Olivia I was able to deliver the stent to the more distal lesion and deployed at 14 patricia.   Steams 10 balloon was used to predilate the more proximal lesion.  I then used a 3.0 x 23 mm nilo point drug-eluting stent deployed in the mid RCA overlapping with the first stent.  The more proximal calcified lesion was more apparent and I decided to cover that given the diabetes over the extensive to 70 degree arc of calcium in that segment and again the IVUS catheter would not pass beyond this lesion this is at the takeoff of the first marginal branch.  I then used a 3.5 x 23 mm nilo point drug-eluting stent, did I was prior to the stenting and it did show medial vessel diameter of 3.5-4 in the proximal portion.  I then used a 3.5 NC balloon to post dilate and multiple segments of the overlapping stents at high pressures.      Post stent deployment angio pictures showed good expansion with 0% residual stenosis, CATALINA 3 flow in the distal vascular bed and no dissection or distal wire perforation.    Lesion length: 50 mm  Pre PCI Stenosis: 80 %  Post PCI stenosis: 0 %  Pre PCI CATALINA flow: 3  Post PCI CATALINA flow: 3    EBL: Less than 10cc          Recommendations:  Aggressive guideline directed medical management for CAD  Dual Anti platelet medication with Asa 81 mg qd and Brilinta 90 mg bid for minimum 1 year.            Felton Mcgovern MD, [facility]  Interventional Cardiology    08/09/23  11:27 EDT      Last Stress test  Results for orders placed during the hospital encounter of 05/31/23    Stress Test With Myocardial Perfusion One Day    Interpretation Summary  Images from the original result were not included.      Stress Procedure    A stress test was performed following the Ashok protocol.    Exercise duration (min) 10 min Exercise duration (sec) 6 sec Estimated workload 13.4 METS    Baseline Vitals Baseline HR 57 bpm Baseline /63 mmHg Peak Stress Vitals Peak  bpm Peak /82 mmHg Recovery Vitals Recovery HR 66 bpm Recovery BP 94/55 mmHg Exercise Data Target HR (85%) 147 bpm Max. Pred. HR (100%) 173 bpm  Percent Max Pred HR 85.55 %    Patient denied any chest discomfort during exercise,    There was no ST segment deviation noted during stress.    There were no significant arrhythmias noted during stress.    Findings consistent with a normal ECG stress test.    Nuclear Perfusion Findings    Myocardial perfusion imaging indicates a normal myocardial perfusion study with no evidence of ischemia.    TID:0.99    Normal LV cavity size. Normal LV wall motion noted.    Left ventricular ejection fraction is normal (Calculated EF = 69%).    Impressions are consistent with a low risk study.       Last Echo  Results for orders placed during the hospital encounter of 05/31/23    Adult Transthoracic Echo Complete W/ Cont if Necessary Per Protocol    Interpretation Summary    Normal left ventricular cavity size and wall thickness noted. All left ventricular wall segments contract normally.    Left ventricular ejection fraction appears to be 61 - 65%.    The aortic valve is not well visualized. No aortic valve regurgitation or stenosis is present. The aortic valve is grossly normal in structure.    The mitral valve is structurally normal with no significant stenosis present. Mild mitral valve regurgitation is present.    There is no evidence of pericardial effusion. .             Current Outpatient Medications   Medication Sig Dispense Refill    acetaminophen (TYLENOL) 325 MG tablet Take 2 tablets by mouth Every 4 (Four) Hours As Needed for Mild Pain or Fever (temperature greater than 101F). 90 tablet 1    aspirin 81 MG EC tablet Take 1 tablet by mouth Daily. 90 tablet 3    atorvastatin (LIPITOR) 20 MG tablet Take 1 tablet by mouth Every Night. 90 tablet 3    buPROPion SR (Wellbutrin SR) 150 MG 12 hr tablet Take 1 tablet by mouth Daily.      Inclisiran Sodium 284 MG/1.5ML solution prefilled syringe Inject 1.5 mL under the skin into the appropriate area as directed Every 3 (Three) Months. 1.5 mL 1    lisinopril (PRINIVIL,ZESTRIL) 5  MG tablet Take 1 tablet by mouth Daily. 90 tablet 3    nitroglycerin (NITROSTAT) 0.4 MG SL tablet Dissolve 1 tablet under the tongue as needed for angina, may repeat every 5 minutes for up three doses. 25 tablet 0    ticagrelor (BRILINTA) 90 MG tablet tablet Take 1 tablet by mouth 2 (Two) Times a Day. 180 tablet 3    pantoprazole (Protonix) 40 MG EC tablet Take 1 tablet by mouth Daily. 30 tablet 0     No current facility-administered medications for this visit.            Assessment and Plan    Problem List Items Addressed This Visit       ASCVD (arteriosclerotic cardiovascular disease) (Chronic)    Overview     5/31/2023 nuclear stress test: Myocardial perfusion imaging indicates a normal myocardial perfusion study with no evidence of ischemia  6/2/2023 TTE: LVEF 61 to 65%, mild MR  7/20/2023 CT angiogram the coronary arteries: Moderate stenosis are noted involving the RCA, LAD, D1, OM1, and proximal circumflex.  7/27/2023 [facility] for angina/abnormal coronary CT: PCI VITO to the LAD  8/9/2023 [facility] : Staged PCI VITO to the RCA.         Dyslipidemia, goal LDL below 55 (Chronic)    Overview     7/27/2023 total cholesterol 118, triglycerides 50, HDL 45, and LDL 61  8/10/2023 total cholesterol 104,  triglycerides 53, HDL 46, and LDL 45         Essential hypertension (Chronic)    Smoker (Chronic)     Other Visit Diagnoses       Chest pain due to GERD    -  Primary    Relevant Medications    pantoprazole (Protonix) 40 MG EC tablet          Diagnoses and all orders for this visit:    1. Chest pain due to GERD (Primary)  -     pantoprazole (Protonix) 40 MG EC tablet; Take 1 tablet by mouth Daily.  Dispense: 30 tablet; Refill: 0    2. ASCVD (arteriosclerotic cardiovascular disease)    3. Dyslipidemia, goal LDL below 55    4. Essential hypertension    5. Smoker      His plan is to continue to follow-up with Dr. Morales's office.      Follow Up     No follow-ups on file.    Patient was given instructions and counseling regarding his  condition or for health maintenance advice. Please see specific information pulled into the AVS if appropriate.           Dictated Utilizing Dragon Dictation: Part of this note may be an electronic transcription/translation of spoken language to printed text using the Dragon Dictation System

## 2024-04-17 ENCOUNTER — HOSPITAL ENCOUNTER (OUTPATIENT)
Dept: MRI IMAGING | Facility: HOSPITAL | Age: 49
Discharge: HOME | End: 2024-04-17
Payer: COMMERCIAL

## 2024-04-17 ENCOUNTER — LAB (OUTPATIENT)
Dept: LAB | Facility: HOSPITAL | Age: 49
End: 2024-04-17
Payer: COMMERCIAL

## 2024-04-17 DIAGNOSIS — M47.816 SPONDYLOSIS WITHOUT MYELOPATHY OR RADICULOPATHY, LUMBAR REGION: ICD-10-CM

## 2024-04-17 DIAGNOSIS — I25.10 ASCVD (ARTERIOSCLEROTIC CARDIOVASCULAR DISEASE): Chronic | ICD-10-CM

## 2024-04-17 DIAGNOSIS — E78.5 DYSLIPIDEMIA, GOAL LDL BELOW 70: Chronic | ICD-10-CM

## 2024-04-17 PROCEDURE — 80061 LIPID PANEL: CPT

## 2024-04-17 PROCEDURE — 36415 COLL VENOUS BLD VENIPUNCTURE: CPT

## 2024-04-17 PROCEDURE — 72148 MRI LUMBAR SPINE W/O DYE: CPT

## 2024-04-18 LAB
CHOLEST SERPL-MCNC: 117 MG/DL (ref 0–200)
HDLC SERPL-MCNC: 51 MG/DL (ref 40–60)
LDLC SERPL CALC-MCNC: 54 MG/DL (ref 0–100)
LDLC/HDLC SERPL: 1.1 {RATIO}
TRIGL SERPL-MCNC: 49 MG/DL (ref 0–150)
VLDLC SERPL-MCNC: 12 MG/DL (ref 5–40)

## 2024-04-22 ENCOUNTER — SPECIALTY PHARMACY (OUTPATIENT)
Dept: PHARMACY | Facility: HOSPITAL | Age: 49
End: 2024-04-22
Payer: COMMERCIAL

## 2024-04-22 NOTE — PROGRESS NOTES
Specialty Pharmacy Refill Coordination Note     Rico is a 48 y.o. male contacted today regarding refills of  Leqvio specialty medication(s).    Reviewed and verified with patient:       Specialty medication(s) and dose(s) confirmed: yes    Refill Questions      Flowsheet Row Most Recent Value   Changes to allergies? No   Changes to medications? No   New conditions or infections since last clinic visit No   Unplanned office visit, urgent care, ED, or hospital admission in the last 4 weeks  No   How does patient/caregiver feel medication is working? Very good   Financial problems or insurance changes  No   Since the previous refill, were any specialty medication doses or scheduled injections missed or delayed?  No   Does this patient require a clinical escalation to a pharmacist? No            Delivery Questions      Flowsheet Row Most Recent Value   Copay verified? Yes   Copay amount 0   Copay form of payment No copayment ($0)                   Follow-up: 180 day(s)     Zehra Steward, Pharmacy Technician  Specialty Pharmacy Technician

## 2024-04-29 ENCOUNTER — SPECIALTY PHARMACY (OUTPATIENT)
Dept: PHARMACY | Facility: HOSPITAL | Age: 49
End: 2024-04-29
Payer: COMMERCIAL

## 2024-04-29 ENCOUNTER — DISEASE STATE MANAGEMENT VISIT (OUTPATIENT)
Dept: PHARMACY | Facility: HOSPITAL | Age: 49
End: 2024-04-29
Payer: COMMERCIAL

## 2024-04-29 NOTE — PROGRESS NOTES
Medication Management Clinic  Lipid Management Program - Leqvio (Inclisiranora)     Rico Loaiza is a 48 y.o. male referred to the Medication Management Clinic by Dr. Francisco for clinical pharmacy and specialty pharmacy management of [facility].  Rico Loaiza is  treated for ASCVD and currently takes atorvastatin 20 mg.  In the past, Pt has tried atorvastatin 40mg but could not tolerate higher dose due to muscle pain.     Patient had labs drawn on 4/11/2024, which revealed LDL had slightly increased from 45 mg/dL on 8/10/2023 to 54 mg/dL on 4/17/2024. However, patient reports he was not fasting at time of lab draw.    Relevant Past Medical History and Comorbidities  Past Medical History:   Diagnosis Date    CAD (coronary artery disease), native coronary artery 07/27/2023    Essential hypertension 8/28/2023    Hyperlipidemia     Infectious viral hepatitis      Social History     Socioeconomic History    Marital status: Single   Tobacco Use    Smoking status: Every Day     Current packs/day: 1.50     Average packs/day: 1.5 packs/day for 15.0 years (22.5 ttl pk-yrs)     Types: Cigarettes     Passive exposure: Current    Smokeless tobacco: Never   Vaping Use    Vaping status: Never Used   Substance and Sexual Activity    Alcohol use: Never    Drug use: Yes     Types: Marijuana    Sexual activity: Never       Allergies  Diphenhydramine    Current Medication List    Current Outpatient Medications:     acetaminophen (TYLENOL) 325 MG tablet, Take 2 tablets by mouth Every 4 (Four) Hours As Needed for Mild Pain or Fever (temperature greater than 101F)., Disp: 90 tablet, Rfl: 1    aspirin 81 MG EC tablet, Take 1 tablet by mouth Daily., Disp: 90 tablet, Rfl: 3    atorvastatin (LIPITOR) 20 MG tablet, Take 1 tablet by mouth Every Night., Disp: 90 tablet, Rfl: 3    buPROPion SR (Wellbutrin SR) 150 MG 12 hr tablet, Take 1 tablet by mouth Daily., Disp: , Rfl:     Inclisiran Sodium 284 MG/1.5ML solution prefilled syringe, Inject  1.5 mL under the skin into the appropriate area as directed Every 3 (Three) Months., Disp: 1.5 mL, Rfl: 1    lisinopril (PRINIVIL,ZESTRIL) 5 MG tablet, Take 1 tablet by mouth Daily., Disp: 90 tablet, Rfl: 3    nitroglycerin (NITROSTAT) 0.4 MG SL tablet, Dissolve 1 tablet under the tongue as needed for angina, may repeat every 5 minutes for up three doses., Disp: 25 tablet, Rfl: 0    pantoprazole (Protonix) 40 MG EC tablet, Take 1 tablet by mouth Daily., Disp: 30 tablet, Rfl: 0    ticagrelor (BRILINTA) 90 MG tablet tablet, Take 1 tablet by mouth 2 (Two) Times a Day., Disp: 180 tablet, Rfl: 3    Drug Interactions  None with Leqvio     Relevant Laboratory Values  Lab Results   Component Value Date    CHOL 117 04/17/2024    TRIG 49 04/17/2024    HDL 51 04/17/2024    LDL 54 04/17/2024       Medication Assessment & Plan    Administered Leqvio 284mg SUBQ in back of right arm.   1st dose: 1/29/2024  2nd dose: 4/29/2024  Medication education provided, including:   Common Adverse Effects: Injection site reactions, arthralgias, & bronchitis.  Potential for allergic reaction.  Go to ED/call 911 with any S/Sx of allergic reaction.   Must be administered by a HCP.  If a dose is missed, please call to reschedule.   Expect LDL reduction, to help reduce cardiovascular risk.   At each visit, patient will need to stay a minimum of 15 min for monitoring   Patient had labs drawn on 4/11/2024, which revealed LDL had slightly increased from 45 mg/dL on 8/10/2023 to 54 mg/dL on 4/17/2024. However, patient reports he was not fasting at time of lab draw. Non-fasting labs likely attributed to bump in LDL. However, patient remains at goal (<55 mg/dL).  Patient will continue regular follow-up with cardiology. Follow up scheduled for 7/10/2024. Patient reports he will have routine labs and repeat lipid panel drawn at this appt.  Will follow up in 6 months for next injection.     Thank you,    Naz Urrutia RPH  4/29/2024  13:13 EDT

## 2024-04-29 NOTE — PROGRESS NOTES
Medication Management Clinic  Lipid Management Program - Leqvio (Inclisiranora)     Rico Loaiza is a 48 y.o. male referred to the Medication Management Clinic by Dr. Francisco for clinical pharmacy and specialty pharmacy management of [facility].  Rico Loaiza is  treated for ASCVD and currently takes atorvastatin 20 mg.  In the past, Pt has tried atorvastatin 40mg but could not tolerate higher dose due to muscle pain.     Patient had labs drawn on 4/11/2024, which revealed LDL had slightly increased from 45 mg/dL on 8/10/2023 to 54 mg/dL on 4/17/2024. However, patient reports he was not fasting at time of lab draw.    Relevant Past Medical History and Comorbidities  Past Medical History:   Diagnosis Date    CAD (coronary artery disease), native coronary artery 07/27/2023    Essential hypertension 8/28/2023    Hyperlipidemia     Infectious viral hepatitis      Social History     Socioeconomic History    Marital status: Single   Tobacco Use    Smoking status: Every Day     Current packs/day: 1.50     Average packs/day: 1.5 packs/day for 15.0 years (22.5 ttl pk-yrs)     Types: Cigarettes     Passive exposure: Current    Smokeless tobacco: Never   Vaping Use    Vaping status: Never Used   Substance and Sexual Activity    Alcohol use: Never    Drug use: Yes     Types: Marijuana    Sexual activity: Never       Allergies  Diphenhydramine    Current Medication List    Current Outpatient Medications:     acetaminophen (TYLENOL) 325 MG tablet, Take 2 tablets by mouth Every 4 (Four) Hours As Needed for Mild Pain or Fever (temperature greater than 101F)., Disp: 90 tablet, Rfl: 1    aspirin 81 MG EC tablet, Take 1 tablet by mouth Daily., Disp: 90 tablet, Rfl: 3    atorvastatin (LIPITOR) 20 MG tablet, Take 1 tablet by mouth Every Night., Disp: 90 tablet, Rfl: 3    buPROPion SR (Wellbutrin SR) 150 MG 12 hr tablet, Take 1 tablet by mouth Daily., Disp: , Rfl:     Inclisiran Sodium 284 MG/1.5ML solution prefilled syringe, Inject  1.5 mL under the skin into the appropriate area as directed Every 3 (Three) Months., Disp: 1.5 mL, Rfl: 1    lisinopril (PRINIVIL,ZESTRIL) 5 MG tablet, Take 1 tablet by mouth Daily., Disp: 90 tablet, Rfl: 3    nitroglycerin (NITROSTAT) 0.4 MG SL tablet, Dissolve 1 tablet under the tongue as needed for angina, may repeat every 5 minutes for up three doses., Disp: 25 tablet, Rfl: 0    pantoprazole (Protonix) 40 MG EC tablet, Take 1 tablet by mouth Daily., Disp: 30 tablet, Rfl: 0    ticagrelor (BRILINTA) 90 MG tablet tablet, Take 1 tablet by mouth 2 (Two) Times a Day., Disp: 180 tablet, Rfl: 3    Drug Interactions  None with Leqvio     Relevant Laboratory Values  Lab Results   Component Value Date    CHOL 117 04/17/2024    TRIG 49 04/17/2024    HDL 51 04/17/2024    LDL 54 04/17/2024       Medication Assessment & Plan    Administered Leqvio 284mg SUBQ in back of right arm.   1st dose: 1/29/2024  2nd dose: 4/29/2024  Medication education provided, including:   Common Adverse Effects: Injection site reactions, arthralgias, & bronchitis.  Potential for allergic reaction.  Go to ED/call 911 with any S/Sx of allergic reaction.   Must be administered by a HCP.  If a dose is missed, please call to reschedule.   Expect LDL reduction, to help reduce cardiovascular risk.   At each visit, patient will need to stay a minimum of 15 min for monitoring   Patient had labs drawn on 4/11/2024, which revealed LDL had slightly increased from 45 mg/dL on 8/10/2023 to 54 mg/dL on 4/17/2024. However, patient reports he was not fasting at time of lab draw. Non-fasting labs likely attributed to bump in LDL. However, patient remains at goal (<55 mg/dL).  Patient will continue regular follow-up with cardiology. Follow up scheduled for 7/10/2024. Patient reports he will have routine labs and repeat lipid panel drawn at this appt.  Will follow up in 6 months for next injection.     Thank you,    Naz Urrutia RPH  4/29/2024  13:36 EDT

## 2024-05-10 ENCOUNTER — TELEPHONE (OUTPATIENT)
Dept: CARDIOLOGY | Facility: CLINIC | Age: 49
End: 2024-05-10
Payer: COMMERCIAL

## 2024-05-10 NOTE — TELEPHONE ENCOUNTER
Caller: Rico Loaiza    Relationship: Self    Best call back number: [phone]     Which medication are you concerned about: ticagrelor (BRILINTA) 90 MG tablet tablet [[id number]] (Order [id number])     Who prescribed you this medication: JONATHON MONDRAGON     When did you start taking this medication: APPROX SEPT 2023     What are your concerns:   WANTS TO LET PROVIDER KNOW THAT HE HAS NOTICED EVEN WITH TAKING BLOOD THINNERS HIS BLOOD IS STILL REALLY THICK WHEN HE HAPPENS  CUTS HIMSELF     HAS BEEN TAKING ibuprofen, STATES HE KNOWS HE ISN'T SUPPOSED TO DO THIS, PT STATES HIS CHOLESTEROL IS IN CONTROL THOUGH.       WONDERING IF HE NEEDS TO BE SEEN SOONER THAN JULY

## 2024-05-13 NOTE — TELEPHONE ENCOUNTER
I'm not sure what he means. As long as he is taking as prescribed and is not bleeding he can continue. Ibuprofen doesn't effect cholesterol.

## 2024-05-13 NOTE — TELEPHONE ENCOUNTER
Name: Juventino Loaizahon    Relationship: Self    Best Callback Number: [phone]    HUB PROVIDED THE RELAY MESSAGE FROM THE OFFICE   PATIENT HAS FURTHER QUESTIONS AND WOULD LIKE A CALL BACK AT THE FOLLOWING PHONE NUMBER [phone]    ADDITIONAL INFORMATION: PATIENT IS CONCERNED WITH HOW THICK HIS BLOOD IS.

## 2024-05-13 NOTE — TELEPHONE ENCOUNTER
HUB TO RELAY  Per Bob: As long as he is taking as prescribed and is not bleeding he can continue. Ibuprofen doesn't effect cholesterol.   No answer left  for call back.

## 2024-05-14 NOTE — TELEPHONE ENCOUNTER
Spoke with pt and advised him of Bob's message and explained that he is already on blood thinners. He then went on to talk about how much his back was hurting. I explained to him that a blood thinner wouldn't help his back. Pt states he knows that and will try to see his primary for his back pain.

## 2024-07-29 ENCOUNTER — OFFICE VISIT (OUTPATIENT)
Dept: CARDIOLOGY | Facility: CLINIC | Age: 49
End: 2024-07-29
Payer: COMMERCIAL

## 2024-07-29 VITALS
OXYGEN SATURATION: 96 % | WEIGHT: 131.4 LBS | HEIGHT: 66 IN | BODY MASS INDEX: 21.12 KG/M2 | DIASTOLIC BLOOD PRESSURE: 73 MMHG | SYSTOLIC BLOOD PRESSURE: 117 MMHG | HEART RATE: 68 BPM

## 2024-07-29 DIAGNOSIS — E78.5 DYSLIPIDEMIA, GOAL LDL BELOW 70: Chronic | ICD-10-CM

## 2024-07-29 DIAGNOSIS — I25.10 ASCVD (ARTERIOSCLEROTIC CARDIOVASCULAR DISEASE): Primary | Chronic | ICD-10-CM

## 2024-07-29 DIAGNOSIS — I10 ESSENTIAL HYPERTENSION: Chronic | ICD-10-CM

## 2024-07-29 PROCEDURE — 1160F RVW MEDS BY RX/DR IN RCRD: CPT | Performed by: PHYSICIAN ASSISTANT

## 2024-07-29 PROCEDURE — 3078F DIAST BP <80 MM HG: CPT | Performed by: PHYSICIAN ASSISTANT

## 2024-07-29 PROCEDURE — 93000 ELECTROCARDIOGRAM COMPLETE: CPT | Performed by: PHYSICIAN ASSISTANT

## 2024-07-29 PROCEDURE — 99214 OFFICE O/P EST MOD 30 MIN: CPT | Performed by: PHYSICIAN ASSISTANT

## 2024-07-29 PROCEDURE — 1159F MED LIST DOCD IN RCRD: CPT | Performed by: PHYSICIAN ASSISTANT

## 2024-07-29 PROCEDURE — 3074F SYST BP LT 130 MM HG: CPT | Performed by: PHYSICIAN ASSISTANT

## 2024-07-29 RX ORDER — CYCLOBENZAPRINE HCL 10 MG
10 TABLET ORAL 2 TIMES DAILY PRN
COMMUNITY

## 2024-07-29 NOTE — PROGRESS NOTES
Fany Rocha APRN  Rico Loaiza  1975  07/29/2024    Patient Active Problem List   Diagnosis    Precordial pain    ASCVD (arteriosclerotic cardiovascular disease)    Dyslipidemia, goal LDL below 55    Essential hypertension    Smoker    Statin intolerance       Dear Fany Rocha APRN:    Subjective     History of Present Illness:    Chief Complaint   Patient presents with    Follow-up     Coronary artery disease  5/31/2023 nuclear stress test showing normal myocardial perfusion with no evidence of ischemia  CT coronary angiogram 7/20/2023: Moderate stenosis involving RCA LAD and Lcx  [facility] 7/27/2023: PCI to the LAD  [facility] 8/9/2023: Staged PCI to the RCA    Rico Loaiza is a pleasant 48 y.o. male with a past medical history significant for coronary artery disease status post stenting of the LAD on 7/27/2023 with staged PCI of the RCA on 8/9/2023.  Patient also has lifelong history of tobacco abuse, Essential hypertension, dyslipidemia.  He comes in today for cardiology follow-up.     Patient denies any chest pain, shortness of breath, palpitations, dizziness, syncope or near syncope. Blood pressure is well controlled and he reports compliance with medications.     Allergies   Allergen Reactions    Diphenhydramine Delirium   :      Current Outpatient Medications:     acetaminophen (TYLENOL) 325 MG tablet, Take 2 tablets by mouth Every 4 (Four) Hours As Needed for Mild Pain or Fever (temperature greater than 101F)., Disp: 90 tablet, Rfl: 1    aspirin 81 MG EC tablet, Take 1 tablet by mouth Daily., Disp: 90 tablet, Rfl: 3    atorvastatin (LIPITOR) 20 MG tablet, Take 1 tablet by mouth Every Night., Disp: 90 tablet, Rfl: 3    buPROPion SR (Wellbutrin SR) 150 MG 12 hr tablet, Take 1 tablet by mouth Daily., Disp: , Rfl:     Inclisiran Sodium 284 MG/1.5ML solution prefilled syringe, Inject 1.5 mL under the skin into the appropriate area as directed Every 3 (Three) Months., Disp: 1.5 mL, Rfl: 1    lisinopril  "(PRINIVIL,ZESTRIL) 5 MG tablet, Take 1 tablet by mouth Daily., Disp: 90 tablet, Rfl: 3    ticagrelor (BRILINTA) 90 MG tablet tablet, Take 1 tablet by mouth 2 (Two) Times a Day., Disp: 180 tablet, Rfl: 3    cyclobenzaprine (FLEXERIL) 10 MG tablet, Take 1 tablet by mouth 2 (Two) Times a Day As Needed., Disp: , Rfl:     nitroglycerin (NITROSTAT) 0.4 MG SL tablet, Dissolve 1 tablet under the tongue as needed for angina, may repeat every 5 minutes for up three doses., Disp: 25 tablet, Rfl: 0    pantoprazole (Protonix) 40 MG EC tablet, Take 1 tablet by mouth Daily. (Patient not taking: Reported on 7/29/2024), Disp: 30 tablet, Rfl: 0    The following portions of the patient's history were reviewed and updated as appropriate: allergies, current medications, past family history, past medical history, past social history, past surgical history and problem list.    Social History     Tobacco Use    Smoking status: Every Day     Current packs/day: 1.50     Average packs/day: 1.5 packs/day for 15.0 years (22.5 ttl pk-yrs)     Types: Cigarettes     Passive exposure: Current    Smokeless tobacco: Never   Vaping Use    Vaping status: Never Used   Substance Use Topics    Alcohol use: Never    Drug use: Yes     Types: Marijuana         Objective   Vitals:    07/29/24 1445   BP: 117/73   BP Location: Left arm   Patient Position: Sitting   Cuff Size: Adult   Pulse: 68   SpO2: 96%   Weight: 59.6 kg (131 lb 6.4 oz)   Height: 167.6 cm (65.98\")     Body mass index is 21.22 kg/m².    ROS    Constitutional:       General: Not in acute distress.     Appearance: Healthy appearance. Well-developed and not in distress. Not diaphoretic.   Eyes:      Conjunctiva/sclera: Conjunctivae normal.      Pupils: Pupils are equal, round, and reactive to light.   HENT:      Head: Normocephalic and atraumatic.   Neck:      Vascular: No carotid bruit or JVD.   Pulmonary:      Effort: Pulmonary effort is normal. No respiratory distress.      Breath sounds: " "Normal breath sounds.   Cardiovascular:      Normal rate. Regular rhythm.   Edema:     Peripheral edema absent.   Skin:     General: Skin is cool.   Neurological:      Mental Status: Alert, oriented to person, place, and time and oriented to person, place and time.         Lab Results   Component Value Date     (L) 08/10/2023    K 3.9 08/10/2023    CL 99 08/10/2023    CO2 20.2 (L) 08/10/2023    BUN 10 08/10/2023    CREATININE 0.93 08/10/2023    GLUCOSE 127 (H) 08/10/2023    CALCIUM 8.6 08/10/2023    AST 21 07/27/2023    ALT 15 07/27/2023    ALKPHOS 67 07/27/2023     No results found for: \"CKTOTAL\"  Lab Results   Component Value Date    WBC 9.42 08/10/2023    HGB 11.5 (L) 08/10/2023    HCT 33.5 (L) 08/10/2023     08/10/2023     Lab Results   Component Value Date    INR 0.89 (L) 06/12/2019     No results found for: \"MG\"  Lab Results   Component Value Date    TSH 1.250 07/27/2023    TRIG 49 04/17/2024    HDL 51 04/17/2024    LDL 54 04/17/2024      No results found for: \"BNP\"    During this visit the following were done:  Labs Reviewed []    Labs Ordered []    Radiology Reports Reviewed []    Radiology Ordered []    PCP Records Reviewed []    Referring Provider Records Reviewed []    ER Records Reviewed []    Hospital Records Reviewed []    History Obtained From Family []    Radiology Images Reviewed []    Other Reviewed []    Records Requested []         ECG 12 Lead    Date/Time: 7/29/2024 3:01 PM  Performed by: Bob Maldonado PA-C    Authorized by: Bob Maldonado PA-C  Comparison: compared with previous ECG   Similar to previous ECG  Rhythm: sinus rhythm  Conduction: conduction normal  ST Segments: ST segments normal    Clinical impression: normal ECG          Assessment & Plan    Diagnosis Plan   1. ASCVD (arteriosclerotic cardiovascular disease)        2. Essential hypertension        3. Dyslipidemia, goal LDL below 55                 Recommendations:  Coronary artery disease  Denies any anginal " symptoms continue with GDMT with aspirin, Lipitor, Leqvio, lisinopril, Brilinta  Dyslipidemia  Continue Leqvio  Essential hypertension  Excellently controlled.  Will request labs from PCP    No follow-ups on file.    As always, I appreciate very much the opportunity to participate in the cardiovascular care of your patients.      With Best Regards,    Bob Maldonado PA-C

## 2024-07-31 ENCOUNTER — TELEPHONE (OUTPATIENT)
Dept: CARDIOLOGY | Facility: CLINIC | Age: 49
End: 2024-07-31
Payer: COMMERCIAL

## 2024-07-31 NOTE — TELEPHONE ENCOUNTER
Sent fax request.       ----- Message from Bob Maldonado sent at 7/29/2024  3:34 PM EDT -----  Can we get labs from pcp

## 2024-09-23 DIAGNOSIS — E78.5 DYSLIPIDEMIA, GOAL LDL BELOW 70: Primary | Chronic | ICD-10-CM

## 2024-10-22 ENCOUNTER — SPECIALTY PHARMACY (OUTPATIENT)
Dept: PHARMACY | Facility: HOSPITAL | Age: 49
End: 2024-10-22
Payer: COMMERCIAL

## 2024-10-22 DIAGNOSIS — I25.10 ASCVD (ARTERIOSCLEROTIC CARDIOVASCULAR DISEASE): Chronic | ICD-10-CM

## 2024-10-22 RX ORDER — NITROGLYCERIN 0.4 MG/1
TABLET SUBLINGUAL
Qty: 25 TABLET | Refills: 0 | Status: SHIPPED | OUTPATIENT
Start: 2024-10-22

## 2024-10-22 NOTE — PROGRESS NOTES
Specialty Pharmacy Refill Coordination Note     Rico is a 49 y.o. male contacted today regarding refills of  Leqvio specialty medication(s).    Reviewed and verified with patient:       Specialty medication(s) and dose(s) confirmed: yes    Refill Questions      Flowsheet Row Most Recent Value   Changes to allergies? No   Changes to medications? No   New conditions or infections since last clinic visit No   Unplanned office visit, urgent care, ED, or hospital admission in the last 4 weeks  No   How does patient/caregiver feel medication is working? Good   Financial problems or insurance changes  No   Since the previous refill, were any specialty medication doses or scheduled injections missed or delayed?  No   Does this patient require a clinical escalation to a pharmacist? No            Delivery Questions      Flowsheet Row Most Recent Value   Delivery method  at Pharmacy  [in clinic]   Delivery address Prescription   Medication(s) being filled and delivered --  [Leqvio]   Copay verified? Yes   Copay amount 0   Copay form of payment No copayment ($0)   Signature Required No                   Follow-up: 180 day(s)     Zehra Steward, Pharmacy Technician  Specialty Pharmacy Technician

## 2024-10-28 DIAGNOSIS — I10 ESSENTIAL HYPERTENSION: Primary | ICD-10-CM

## 2024-10-28 RX ORDER — LISINOPRIL 5 MG/1
5 TABLET ORAL DAILY
Qty: 90 TABLET | Refills: 3 | OUTPATIENT
Start: 2024-10-28

## 2024-10-29 ENCOUNTER — DISEASE STATE MANAGEMENT VISIT (OUTPATIENT)
Dept: PHARMACY | Facility: HOSPITAL | Age: 49
End: 2024-10-29
Payer: COMMERCIAL

## 2024-10-29 ENCOUNTER — SPECIALTY PHARMACY (OUTPATIENT)
Dept: PHARMACY | Facility: HOSPITAL | Age: 49
End: 2024-10-29
Payer: COMMERCIAL

## 2024-10-29 RX ORDER — INCLISIRAN 284 MG/1.5ML
1.5 INJECTION, SOLUTION SUBCUTANEOUS
Qty: 1.5 ML | Refills: 0 | Status: SHIPPED | OUTPATIENT
Start: 2024-10-29

## 2024-10-29 NOTE — PROGRESS NOTES
[facility]  Lipid Management Program - Leqvio (Inclisiran)     Rico Loaiza  is a 49 y.o. male referred by their provider, Bob Maldonado, to the Hyperlipidemia Patient Management program offered by [facility] for Lipid Management.  Rico Loaiza is  treated for ASCVD and currently takes Leqvio.  In the past, Pt has tried atorvastatin 40 mg and 20 mg but could not tolerate higher dose due to muscle pain. Patient reports this visit that he was told to stop takin ghis atorvastatin 20 mg tab.     Patient had labs drawn on 4/11/2024, which revealed LDL had slightly increased from 45 mg/dL on 8/10/2023 to 54 mg/dL on 4/17/2024. However, patient reports he was not fasting at time of lab draw.    A Follow-up outreach was conducted, including assessment of therapy appropriateness and specialty medication education for LEQVIO (inclisiran). The patient was introduced to services offered by [facility], including: regular assessments, refill coordination, curbside pick-up or mail order delivery options, prior authorization maintenance, and financial assistance programs as applicable. The patient was also provided with contact information for the pharmacy team.     He reports tolerating his las injection well with no complaints.    Insurance Coverage & Financial Support  [employer] [place]      Relevant Past Medical History and Comorbidities  Relevant medical history and concomitant health conditions were discussed with the patient. The patient's chart has been reviewed for relevant past medical history and comorbid conditions and updated as necessary.  Past Medical History:   Diagnosis Date    CAD (coronary artery disease), native coronary artery 07/27/2023    Essential hypertension 8/28/2023    Hyperlipidemia     Infectious viral hepatitis      Social History     Socioeconomic History    Marital status: Single   Tobacco Use     Smoking status: Every Day     Current packs/day: 1.50     Average packs/day: 1.5 packs/day for 15.0 years (22.5 ttl pk-yrs)     Types: Cigarettes     Passive exposure: Current    Smokeless tobacco: Never   Vaping Use    Vaping status: Never Used   Substance and Sexual Activity    Alcohol use: Never    Drug use: Yes     Types: Marijuana    Sexual activity: Never       Problem list reviewed by Xochitl Boss PharmD on 10/29/2024 at  1:21 PM    Allergies  Known allergies and reactions were discussed with the patient. The patient's chart has been reviewed for  allergy information and updated as necessary.   Allergies   Allergen Reactions    Diphenhydramine Delirium       Allergies reviewed by Xochitl Boss PharmD on 10/29/2024 at  1:20 PM    Relevant Laboratory Values  Relevant laboratory values were discussed with the patient. The following specialty medication dose adjustment(s) are recommended: none  Lab Results   Component Value Date    CHOL 117 04/17/2024    TRIG 49 04/17/2024    HDL 51 04/17/2024    LDL 54 04/17/2024       Current Medication List  This medication list has been reviewed with the patient and evaluated for any interactions or necessary modifications/recommendations, and updated to include all prescription medications, OTC medications, and supplements the patient is currently taking.  This list reflects what is contained in the patient's profile, which has also been marked as reviewed to communicate to other providers it is the most up to date version of the patient's current medication therapy.     Current Outpatient Medications:     acetaminophen (TYLENOL) 325 MG tablet, Take 2 tablets by mouth Every 4 (Four) Hours As Needed for Mild Pain or Fever (temperature greater than 101F)., Disp: 90 tablet, Rfl: 1    aspirin 81 MG EC tablet, Take 1 tablet by mouth Daily., Disp: 90 tablet, Rfl: 3    buPROPion SR (Wellbutrin SR) 150 MG 12 hr tablet, Take 1 tablet by mouth Daily., Disp: , Rfl:      cyclobenzaprine (FLEXERIL) 10 MG tablet, Take 1 tablet by mouth 2 (Two) Times a Day As Needed., Disp: , Rfl:     Inclisiran Sodium (Leqvio) 284 MG/1.5ML solution prefilled syringe, Inject 1.5 mL under the skin into the appropriate area as directed Every 6 (Six) Months., Disp: 1.5 mL, Rfl: 0    lisinopril (PRINIVIL,ZESTRIL) 5 MG tablet, Take 1 tablet by mouth Daily., Disp: 90 tablet, Rfl: 3    nitroglycerin (NITROSTAT) 0.4 MG SL tablet, DISSOLVE 1 TABLET UNDER THE TONGUE AS NEEDED FOR ANGINA MAY REPEAT UP TO THREE DOSES AFTER 5 MINUTES, Disp: 25 tablet, Rfl: 0    ticagrelor (BRILINTA) 90 MG tablet tablet, Take 1 tablet by mouth 2 (Two) Times a Day., Disp: 180 tablet, Rfl: 3    atorvastatin (LIPITOR) 20 MG tablet, Take 1 tablet by mouth Every Night. (Patient not taking: Reported on 10/29/2024), Disp: 90 tablet, Rfl: 3    Inclisiran Sodium 284 MG/1.5ML solution prefilled syringe, Inject 1.5 mL under the skin into the appropriate area as directed Every 3 (Three) Months. (Patient not taking: Reported on 10/29/2024), Disp: 1.5 mL, Rfl: 1    pantoprazole (Protonix) 40 MG EC tablet, Take 1 tablet by mouth Daily. (Patient not taking: Reported on 10/29/2024), Disp: 30 tablet, Rfl: 0    Medicines reviewed by Xochitl Boss, PharmD on 10/29/2024 at  1:21 PM    Drug Interactions  None with leqvio    Goals of Therapy  Goals related to the patient's specialty therapy were discussed with the patient. The Patient Goals segment of this outreach has been reviewed and updated.   Goals Addressed Today        Specialty Pharmacy General Goal      LDL reduction               Medication Assessment & Plan  Patient will begin Leqvio 284mg SC once, 3 months later, then every 6 months.  Administered Leqvio 284mg SUBQ in the back of the LEFT arm.    1st Dose: 1/29/24  2nd dose: 4/29/24  3rd dose: 10/29/24  (Lot # [id number] Exp 05/31/2027)  Medication education provided, including:   Common Adverse Effects: Injection site reactions, arthralgias,  & bronchitis.  Potential for allergic reaction.  Go to ED/call 911 with any S/Sx of allergic reaction.   Must be administered by a HCP.  If a dose is missed, please call to reschedule.   Expect LDL reduction, to help reduce cardiovascular risk.   At each visit, patient will need to stay a minimum of 15 min for monitoring   LDL 54 mg/dl  Care Coordinator to set up future refill outreaches, coordinate prescription delivery, and escalate clinical questions to pharmacist.  Welcome information and patient satisfaction survey to be sent by specialty pharmacy team with patient's initial fill.  Patient will continue regular follow-up with cardiology, next appt 1/29/25  Will follow up in 6 months for next injection. Pharmacist to perform regular assessments no more than (6) months from the previous assessment.     Attestation  Therapeutic appropriateness: Appropriate   I attest the patient was actively involved in and has agreed to the above plan of care. If the prescribed therapy is at any point deemed not appropriate based on the current or future assessments, a consultation will be initiated with the patient's specialty care provider to determine the best course of action. The revised plan of therapy will be documented along with any required assessments and/or additional patient education provided.       Xochitl Boss, PharmD  10/29/2024  13:21 EDT

## 2024-10-29 NOTE — PROGRESS NOTES
[facility]  Lipid Management Program - Leqvio (Inclisiran)     Rico Loaiza  is a 49 y.o. male referred by their provider, Bob Maldonado, to the Hyperlipidemia Patient Management program offered by [facility] for Lipid Management.  Rico Loaiza is  treated for ASCVD and currently takes Leqvio.  In the past, Pt has tried atorvastatin 40 mg and 20 mg but could not tolerate higher dose due to muscle pain. Patient reports this visit that he was told to stop takin ghis atorvastatin 20 mg tab.     Patient had labs drawn on 4/11/2024, which revealed LDL had slightly increased from 45 mg/dL on 8/10/2023 to 54 mg/dL on 4/17/2024. However, patient reports he was not fasting at time of lab draw.    A Follow-up outreach was conducted, including assessment of therapy appropriateness and specialty medication education for LEQVIO (inclisiran). The patient was introduced to services offered by [facility], including: regular assessments, refill coordination, curbside pick-up or mail order delivery options, prior authorization maintenance, and financial assistance programs as applicable. The patient was also provided with contact information for the pharmacy team.     He reports tolerating his las injection well with no complaints.    Insurance Coverage & Financial Support  [employer] [place]      Relevant Past Medical History and Comorbidities  Relevant medical history and concomitant health conditions were discussed with the patient. The patient's chart has been reviewed for relevant past medical history and comorbid conditions and updated as necessary.  Past Medical History:   Diagnosis Date    CAD (coronary artery disease), native coronary artery 07/27/2023    Essential hypertension 8/28/2023    Hyperlipidemia     Infectious viral hepatitis      Social History     Socioeconomic History    Marital status: Single   Tobacco Use     Smoking status: Every Day     Current packs/day: 1.50     Average packs/day: 1.5 packs/day for 15.0 years (22.5 ttl pk-yrs)     Types: Cigarettes     Passive exposure: Current    Smokeless tobacco: Never   Vaping Use    Vaping status: Never Used   Substance and Sexual Activity    Alcohol use: Never    Drug use: Yes     Types: Marijuana    Sexual activity: Never            Allergies  Known allergies and reactions were discussed with the patient. The patient's chart has been reviewed for  allergy information and updated as necessary.   Allergies   Allergen Reactions    Diphenhydramine Delirium            Relevant Laboratory Values  Relevant laboratory values were discussed with the patient. The following specialty medication dose adjustment(s) are recommended: none  Lab Results   Component Value Date    CHOL 117 04/17/2024    TRIG 49 04/17/2024    HDL 51 04/17/2024    LDL 54 04/17/2024       Current Medication List  This medication list has been reviewed with the patient and evaluated for any interactions or necessary modifications/recommendations, and updated to include all prescription medications, OTC medications, and supplements the patient is currently taking.  This list reflects what is contained in the patient's profile, which has also been marked as reviewed to communicate to other providers it is the most up to date version of the patient's current medication therapy.     Current Outpatient Medications:     acetaminophen (TYLENOL) 325 MG tablet, Take 2 tablets by mouth Every 4 (Four) Hours As Needed for Mild Pain or Fever (temperature greater than 101F)., Disp: 90 tablet, Rfl: 1    aspirin 81 MG EC tablet, Take 1 tablet by mouth Daily., Disp: 90 tablet, Rfl: 3    atorvastatin (LIPITOR) 20 MG tablet, Take 1 tablet by mouth Every Night. (Patient not taking: Reported on 10/29/2024), Disp: 90 tablet, Rfl: 3    buPROPion SR (Wellbutrin SR) 150 MG 12 hr tablet, Take 1 tablet by mouth Daily., Disp: , Rfl:      cyclobenzaprine (FLEXERIL) 10 MG tablet, Take 1 tablet by mouth 2 (Two) Times a Day As Needed., Disp: , Rfl:     Inclisiran Sodium (Leqvio) 284 MG/1.5ML solution prefilled syringe, Inject 1.5 mL under the skin into the appropriate area as directed Every 6 (Six) Months., Disp: 1.5 mL, Rfl: 0    Inclisiran Sodium 284 MG/1.5ML solution prefilled syringe, Inject 1.5 mL under the skin into the appropriate area as directed Every 3 (Three) Months. (Patient not taking: Reported on 10/29/2024), Disp: 1.5 mL, Rfl: 1    lisinopril (PRINIVIL,ZESTRIL) 5 MG tablet, Take 1 tablet by mouth Daily., Disp: 90 tablet, Rfl: 3    nitroglycerin (NITROSTAT) 0.4 MG SL tablet, DISSOLVE 1 TABLET UNDER THE TONGUE AS NEEDED FOR ANGINA MAY REPEAT UP TO THREE DOSES AFTER 5 MINUTES, Disp: 25 tablet, Rfl: 0    pantoprazole (Protonix) 40 MG EC tablet, Take 1 tablet by mouth Daily. (Patient not taking: Reported on 10/29/2024), Disp: 30 tablet, Rfl: 0    ticagrelor (BRILINTA) 90 MG tablet tablet, Take 1 tablet by mouth 2 (Two) Times a Day., Disp: 180 tablet, Rfl: 3         Drug Interactions  None with leqvio    Goals of Therapy  Goals related to the patient's specialty therapy were discussed with the patient. The Patient Goals segment of this outreach has been reviewed and updated.   Goals Addressed Today    None         Medication Assessment & Plan  Patient will begin Leqvio 284mg SC once, 3 months later, then every 6 months.  Administered Leqvio 284mg SUBQ in the back of the LEFT arm.    1st Dose: 1/29/24  2nd dose: 4/29/24  3rd dose: 10/29/24  (Lot # [id number] Exp 05/31/2027)  Medication education provided, including:   Common Adverse Effects: Injection site reactions, arthralgias, & bronchitis.  Potential for allergic reaction.  Go to ED/call 911 with any S/Sx of allergic reaction.   Must be administered by a HCP.  If a dose is missed, please call to reschedule.   Expect LDL reduction, to help reduce cardiovascular risk.   At each visit, patient will  need to stay a minimum of 15 min for monitoring   LDL 54 mg/dl  Care Coordinator to set up future refill outreaches, coordinate prescription delivery, and escalate clinical questions to pharmacist.  Welcome information and patient satisfaction survey to be sent by specialty pharmacy team with patient's initial fill.  Patient will continue regular follow-up with cardiology, next appt 1/29/25  Will follow up in 6 months for next injection. Pharmacist to perform regular assessments no more than (6) months from the previous assessment.     Attestation      I attest the patient was actively involved in and has agreed to the above plan of care. If the prescribed therapy is at any point deemed not appropriate based on the current or future assessments, a consultation will be initiated with the patient's specialty care provider to determine the best course of action. The revised plan of therapy will be documented along with any required assessments and/or additional patient education provided.       Xochitl Boss, PharmD  10/29/2024  13:33 EDT

## 2024-11-01 RX ORDER — LISINOPRIL 5 MG/1
5 TABLET ORAL DAILY
Qty: 90 TABLET | Refills: 3 | Status: SHIPPED | OUTPATIENT
Start: 2024-11-01

## 2025-01-25 DIAGNOSIS — I25.10 ASCVD (ARTERIOSCLEROTIC CARDIOVASCULAR DISEASE): Chronic | ICD-10-CM

## 2025-01-27 RX ORDER — NITROGLYCERIN 0.4 MG/1
TABLET SUBLINGUAL
Qty: 25 TABLET | Refills: 2 | Status: SHIPPED | OUTPATIENT
Start: 2025-01-27

## 2025-01-29 ENCOUNTER — OFFICE VISIT (OUTPATIENT)
Dept: CARDIOLOGY | Facility: CLINIC | Age: 50
End: 2025-01-29
Payer: COMMERCIAL

## 2025-01-29 VITALS
DIASTOLIC BLOOD PRESSURE: 78 MMHG | HEART RATE: 76 BPM | OXYGEN SATURATION: 99 % | WEIGHT: 140.2 LBS | BODY MASS INDEX: 22.53 KG/M2 | HEIGHT: 66 IN | SYSTOLIC BLOOD PRESSURE: 126 MMHG

## 2025-01-29 DIAGNOSIS — I10 ESSENTIAL HYPERTENSION: Chronic | ICD-10-CM

## 2025-01-29 DIAGNOSIS — R07.2 PRECORDIAL PAIN: ICD-10-CM

## 2025-01-29 DIAGNOSIS — I25.10 ASCVD (ARTERIOSCLEROTIC CARDIOVASCULAR DISEASE): Primary | Chronic | ICD-10-CM

## 2025-01-29 DIAGNOSIS — E78.5 DYSLIPIDEMIA, GOAL LDL BELOW 70: Chronic | ICD-10-CM

## 2025-01-29 PROCEDURE — 3074F SYST BP LT 130 MM HG: CPT | Performed by: PHYSICIAN ASSISTANT

## 2025-01-29 PROCEDURE — 99214 OFFICE O/P EST MOD 30 MIN: CPT | Performed by: PHYSICIAN ASSISTANT

## 2025-01-29 PROCEDURE — 3078F DIAST BP <80 MM HG: CPT | Performed by: PHYSICIAN ASSISTANT

## 2025-01-29 NOTE — PROGRESS NOTES
Fany Rocha APRN  Rico Loaiza  1975  01/29/2025    Patient Active Problem List   Diagnosis    Precordial pain    ASCVD (arteriosclerotic cardiovascular disease)    Dyslipidemia, goal LDL below 55    Essential hypertension    Smoker    Statin intolerance       Dear Fany Rocha APRN:    Subjective     History of Present Illness:    Chief Complaint   Patient presents with    Follow-up     routine    Shortness of Breath     Coronary artery disease  5/31/2023 nuclear stress test showing normal myocardial perfusion with no evidence of ischemia  CT coronary angiogram 7/20/2023: Moderate stenosis involving RCA LAD and Lcx  [facility] 7/27/2023: PCI to the LAD  [facility] 8/9/2023: Staged PCI to the RCA      Rico Loaiza is a pleasant 49 y.o. male with a past medical history significant for coronary artery disease status post stenting of the LAD on 7/27/2023 with staged PCI of the RCA on 8/9/2023.  Patient also has lifelong history of tobacco abuse, Essential hypertension, dyslipidemia.  He comes in today for cardiology follow-up.     Dario does report has been having some chest pains recently that he describes as a pressure in the center of his chest somewhat similar to the previous pain back in 2023 however reports they are much more mild.  He also reports dyspnea on exertion.  He does thankfully report that he quit smoking 2 months ago and has not smoked since.  Denies any syncope or near syncope.  He reports that this pain he has last for few minutes at a time    Allergies   Allergen Reactions    Diphenhydramine Delirium   :      Current Outpatient Medications:     acetaminophen (TYLENOL) 325 MG tablet, Take 2 tablets by mouth Every 4 (Four) Hours As Needed for Mild Pain or Fever (temperature greater than 101F)., Disp: 90 tablet, Rfl: 1    aspirin 81 MG EC tablet, Take 1 tablet by mouth Daily., Disp: 90 tablet, Rfl: 3    buPROPion SR (Wellbutrin SR) 150 MG 12 hr tablet, Take 1 tablet by mouth Daily., Disp:  ", Rfl:     cyclobenzaprine (FLEXERIL) 10 MG tablet, Take 1 tablet by mouth 2 (Two) Times a Day As Needed., Disp: , Rfl:     Inclisiran Sodium (Leqvio) 284 MG/1.5ML solution prefilled syringe, Inject 1.5 mL under the skin into the appropriate area as directed Every 6 (Six) Months., Disp: 1.5 mL, Rfl: 0    Inclisiran Sodium 284 MG/1.5ML solution prefilled syringe, Inject 1.5 mL under the skin into the appropriate area as directed Every 3 (Three) Months., Disp: 1.5 mL, Rfl: 1    lisinopril (PRINIVIL,ZESTRIL) 5 MG tablet, Take 1 tablet by mouth Daily., Disp: 90 tablet, Rfl: 3    nitroglycerin (NITROSTAT) 0.4 MG SL tablet, DISSOLVE 1 TABLET UNDER THE TONGUE AS NEEDED FOR ANGINA MAY REPEAT UP TO THREE DOSES AFTER 5 MINUTES, Disp: 25 tablet, Rfl: 2    ticagrelor (BRILINTA) 90 MG tablet tablet, Take 1 tablet by mouth 2 (Two) Times a Day., Disp: 180 tablet, Rfl: 3    atorvastatin (LIPITOR) 20 MG tablet, Take 1 tablet by mouth Every Night. (Patient not taking: Reported on 1/29/2025), Disp: 90 tablet, Rfl: 3    pantoprazole (Protonix) 40 MG EC tablet, Take 1 tablet by mouth Daily. (Patient not taking: Reported on 7/29/2024), Disp: 30 tablet, Rfl: 0    The following portions of the patient's history were reviewed and updated as appropriate: allergies, current medications, past family history, past medical history, past social history, past surgical history and problem list.    Social History     Tobacco Use    Smoking status: Every Day     Current packs/day: 1.50     Average packs/day: 1.5 packs/day for 15.0 years (22.5 ttl pk-yrs)     Types: Cigarettes     Passive exposure: Current    Smokeless tobacco: Never   Vaping Use    Vaping status: Never Used   Substance Use Topics    Alcohol use: Never    Drug use: Yes     Types: Marijuana         Objective   Vitals:    01/29/25 1422   BP: 126/78   Pulse: 76   SpO2: 99%   Weight: 63.6 kg (140 lb 3.2 oz)   Height: 167.6 cm (66\")     Body mass index is 22.63 " "kg/m².    ROS    Constitutional:       General: Not in acute distress.     Appearance: Healthy appearance. Well-developed and not in distress. Not diaphoretic.   Eyes:      Conjunctiva/sclera: Conjunctivae normal.      Pupils: Pupils are equal, round, and reactive to light.   HENT:      Head: Normocephalic and atraumatic.   Neck:      Vascular: No carotid bruit or JVD.   Pulmonary:      Effort: Pulmonary effort is normal. No respiratory distress.      Breath sounds: Normal breath sounds.   Cardiovascular:      Normal rate. Regular rhythm.   Edema:     Peripheral edema absent.   Skin:     General: Skin is cool.   Neurological:      Mental Status: Alert, oriented to person, place, and time and oriented to person, place and time.         Lab Results   Component Value Date     (L) 08/10/2023    K 3.9 08/10/2023    CL 99 08/10/2023    CO2 20.2 (L) 08/10/2023    BUN 10 08/10/2023    CREATININE 0.93 08/10/2023    GLUCOSE 127 (H) 08/10/2023    CALCIUM 8.6 08/10/2023    AST 21 07/27/2023    ALT 15 07/27/2023    ALKPHOS 67 07/27/2023     No results found for: \"CKTOTAL\"  Lab Results   Component Value Date    WBC 9.42 08/10/2023    HGB 11.5 (L) 08/10/2023    HCT 33.5 (L) 08/10/2023     08/10/2023     Lab Results   Component Value Date    INR 0.89 (L) 06/12/2019     No results found for: \"MG\"  Lab Results   Component Value Date    TSH 1.250 07/27/2023    TRIG 49 04/17/2024    HDL 51 04/17/2024    LDL 54 04/17/2024      No results found for: \"BNP\"    During this visit the following were done:  Labs Reviewed []    Labs Ordered []    Radiology Reports Reviewed []    Radiology Ordered []    PCP Records Reviewed []    Referring Provider Records Reviewed []    ER Records Reviewed []    Hospital Records Reviewed []    History Obtained From Family []    Radiology Images Reviewed []    Other Reviewed []    Records Requested []       Procedures    Assessment & Plan    Diagnosis Plan   1. ASCVD (arteriosclerotic cardiovascular " disease)        2. Dyslipidemia, goal LDL below 55        3. Essential hypertension                 Recommendations:  Precordial pain  Given history of CAD will order stress test and echocardiogram  ASCVD  Continue aspirin, Leqvio, lisinopril, Brilinta  Essential hypertension   well-controlled    No follow-ups on file.    As always, I appreciate very much the opportunity to participate in the cardiovascular care of your patients.      With Best Regards,    Bob Maldonado PA-C

## 2025-03-11 ENCOUNTER — HOSPITAL ENCOUNTER (OUTPATIENT)
Dept: NUCLEAR MEDICINE | Facility: HOSPITAL | Age: 50
Discharge: HOME | End: 2025-03-11
Payer: COMMERCIAL

## 2025-03-11 ENCOUNTER — HOSPITAL ENCOUNTER (OUTPATIENT)
Dept: CARDIOLOGY | Facility: HOSPITAL | Age: 50
Discharge: HOME | End: 2025-03-11
Admitting: PHYSICIAN ASSISTANT
Payer: COMMERCIAL

## 2025-03-11 ENCOUNTER — HOSPITAL ENCOUNTER (OUTPATIENT)
Dept: CARDIOLOGY | Facility: HOSPITAL | Age: 50
Discharge: HOME | End: 2025-03-11
Payer: COMMERCIAL

## 2025-03-11 DIAGNOSIS — R07.2 PRECORDIAL PAIN: ICD-10-CM

## 2025-03-11 LAB
BH CV REST NUCLEAR ISOTOPE DOSE: 9.6 MCI
BH CV STRESS COMMENTS STAGE 1: NORMAL
BH CV STRESS DOSE REGADENOSON STAGE 1: 0.4
BH CV STRESS DURATION MIN STAGE 1: 0
BH CV STRESS DURATION SEC STAGE 1: 10
BH CV STRESS NUCLEAR ISOTOPE DOSE: 32.1 MCI
BH CV STRESS PROTOCOL 1: NORMAL
BH CV STRESS RECOVERY BP: NORMAL MMHG
BH CV STRESS RECOVERY HR: 74 BPM
BH CV STRESS STAGE 1: 1
MAXIMAL PREDICTED HEART RATE: 171 BPM
PERCENT MAX PREDICTED HR: 54.97 %
SPECT HRT GATED+EF W RNC IV: 70 %
STRESS BASELINE BP: NORMAL MMHG
STRESS BASELINE HR: 58 BPM
STRESS PERCENT HR: 65 %
STRESS POST PEAK BP: NORMAL MMHG
STRESS POST PEAK HR: 94 BPM
STRESS TARGET HR: 145 BPM

## 2025-03-11 PROCEDURE — A9500 TC99M SESTAMIBI: HCPCS | Performed by: PHYSICIAN ASSISTANT

## 2025-03-11 PROCEDURE — 93306 TTE W/DOPPLER COMPLETE: CPT

## 2025-03-11 PROCEDURE — 34310000005 TECHNETIUM SESTAMIBI: Performed by: PHYSICIAN ASSISTANT

## 2025-03-11 PROCEDURE — 25010000002 AMINOPHYLLINE PER 250 MG: Performed by: PHYSICIAN ASSISTANT

## 2025-03-11 PROCEDURE — 93017 CV STRESS TEST TRACING ONLY: CPT

## 2025-03-11 PROCEDURE — 78451 HT MUSCLE IMAGE SPECT SING: CPT

## 2025-03-11 PROCEDURE — 25010000002 REGADENOSON 0.4 MG/5ML SOLUTION: Performed by: PHYSICIAN ASSISTANT

## 2025-03-11 RX ORDER — AMINOPHYLLINE 25 MG/ML
125 INJECTION, SOLUTION INTRAVENOUS
Status: COMPLETED | OUTPATIENT
Start: 2025-03-11 | End: 2025-03-11

## 2025-03-11 RX ORDER — REGADENOSON 0.08 MG/ML
0.4 INJECTION, SOLUTION INTRAVENOUS
Status: COMPLETED | OUTPATIENT
Start: 2025-03-11 | End: 2025-03-11

## 2025-03-11 RX ADMIN — TECHNETIUM TC 99M SESTAMIBI 1 DOSE: 1 INJECTION INTRAVENOUS at 12:02

## 2025-03-11 RX ADMIN — AMINOPHYLLINE 50 MG: 25 INJECTION, SOLUTION INTRAVENOUS at 12:37

## 2025-03-11 RX ADMIN — TECHNETIUM TC 99M SESTAMIBI 1 DOSE: 1 INJECTION INTRAVENOUS at 13:08

## 2025-03-11 RX ADMIN — REGADENOSON 0.4 MG: 0.08 INJECTION, SOLUTION INTRAVENOUS at 12:31

## 2025-03-12 LAB
AV MEAN PRESS GRAD SYS DOP V1V2: 5 MMHG
AV VMAX SYS DOP: 160 CM/SEC
BH CV ECHO MEAS - ACS: 2 CM
BH CV ECHO MEAS - AO MAX PG: 10.2 MMHG
BH CV ECHO MEAS - AO ROOT DIAM: 3.2 CM
BH CV ECHO MEAS - AO V2 VTI: 29.6 CM
BH CV ECHO MEAS - AVA(I,D): 2.7 CM2
BH CV ECHO MEAS - EDV(CUBED): 82.3 ML
BH CV ECHO MEAS - EDV(MOD-SP4): 59.7 ML
BH CV ECHO MEAS - EF(MOD-SP4): 71.9 %
BH CV ECHO MEAS - ESV(CUBED): 26.2 ML
BH CV ECHO MEAS - ESV(MOD-SP4): 16.8 ML
BH CV ECHO MEAS - FS: 31.7 %
BH CV ECHO MEAS - IVS/LVPW: 1.09 CM
BH CV ECHO MEAS - IVSD: 0.74 CM
BH CV ECHO MEAS - LA DIMENSION: 2.6 CM
BH CV ECHO MEAS - LAT PEAK E' VEL: 11.3 CM/SEC
BH CV ECHO MEAS - LV DIASTOLIC VOL/BSA (35-75): 34.7 CM2
BH CV ECHO MEAS - LV MASS(C)D: 91.8 GRAMS
BH CV ECHO MEAS - LV MAX PG: 9.7 MMHG
BH CV ECHO MEAS - LV MEAN PG: 4 MMHG
BH CV ECHO MEAS - LV SYSTOLIC VOL/BSA (12-30): 9.8 CM2
BH CV ECHO MEAS - LV V1 MAX: 156 CM/SEC
BH CV ECHO MEAS - LV V1 VTI: 25.9 CM
BH CV ECHO MEAS - LVIDD: 4.4 CM
BH CV ECHO MEAS - LVIDS: 3 CM
BH CV ECHO MEAS - LVOT AREA: 3.1 CM2
BH CV ECHO MEAS - LVOT DIAM: 2 CM
BH CV ECHO MEAS - LVPWD: 0.68 CM
BH CV ECHO MEAS - MED PEAK E' VEL: 10.6 CM/SEC
BH CV ECHO MEAS - MV A MAX VEL: 61.3 CM/SEC
BH CV ECHO MEAS - MV E MAX VEL: 90.4 CM/SEC
BH CV ECHO MEAS - MV E/A: 1.47
BH CV ECHO MEAS - PA ACC TIME: 0.07 SEC
BH CV ECHO MEAS - SV(LVOT): 81.4 ML
BH CV ECHO MEAS - SV(MOD-SP4): 42.9 ML
BH CV ECHO MEAS - SVI(LVOT): 47.3 ML/M2
BH CV ECHO MEAS - SVI(MOD-SP4): 25 ML/M2
BH CV ECHO MEAS - TAPSE (>1.6): 3.1 CM
BH CV ECHO MEASUREMENTS AVERAGE E/E' RATIO: 8.26
LEFT ATRIUM VOLUME INDEX: 18 ML/M2

## 2025-03-19 ENCOUNTER — OFFICE VISIT (OUTPATIENT)
Dept: CARDIOLOGY | Facility: CLINIC | Age: 50
End: 2025-03-19
Payer: COMMERCIAL

## 2025-03-19 VITALS
HEART RATE: 76 BPM | BODY MASS INDEX: 22.63 KG/M2 | OXYGEN SATURATION: 97 % | DIASTOLIC BLOOD PRESSURE: 79 MMHG | SYSTOLIC BLOOD PRESSURE: 129 MMHG | HEIGHT: 66 IN

## 2025-03-19 DIAGNOSIS — I10 ESSENTIAL HYPERTENSION: Chronic | ICD-10-CM

## 2025-03-19 DIAGNOSIS — I25.10 ASCVD (ARTERIOSCLEROTIC CARDIOVASCULAR DISEASE): Primary | Chronic | ICD-10-CM

## 2025-03-19 DIAGNOSIS — E78.5 DYSLIPIDEMIA, GOAL LDL BELOW 70: Chronic | ICD-10-CM

## 2025-03-19 PROCEDURE — 99214 OFFICE O/P EST MOD 30 MIN: CPT | Performed by: PHYSICIAN ASSISTANT

## 2025-03-19 PROCEDURE — 3074F SYST BP LT 130 MM HG: CPT | Performed by: PHYSICIAN ASSISTANT

## 2025-03-19 PROCEDURE — 3078F DIAST BP <80 MM HG: CPT | Performed by: PHYSICIAN ASSISTANT

## 2025-03-19 RX ORDER — VARENICLINE TARTRATE 1 MG/1
TABLET, FILM COATED ORAL
COMMUNITY

## 2025-03-19 NOTE — PROGRESS NOTES
Fany Rocha APRN  Rico Loaiza  1975  03/19/2025    Patient Active Problem List   Diagnosis    Precordial pain    ASCVD (arteriosclerotic cardiovascular disease)    Dyslipidemia, goal LDL below 55    Essential hypertension    Smoker    Statin intolerance       Dear Fany Rocha APRN:    Subjective     History of Present Illness:    Chief Complaint   Patient presents with    Results     STRESS AND ECHO     Coronary artery disease  5/31/2023 nuclear stress test showing normal myocardial perfusion with no evidence of ischemia  CT coronary angiogram 7/20/2023: Moderate stenosis involving RCA LAD and Lcx  [facility] 7/27/2023: PCI to the LAD  [facility] 8/9/2023: Staged PCI to the RCA      Rico Loaiza is a pleasant 49 y.o. male with a past medical history significant for coronary artery disease status post stenting of the LAD on 7/27/2023 with staged PCI of the RCA on 8/9/2023.  Patient also has lifelong history of tobacco abuse, Essential hypertension, dyslipidemia.  He comes in today for cardiology follow-up.     Dario did have stress test and echocardiogram performed.  Stress test showed small to moderate size mild degree of ischemia in the apex and septal wall consistent with intermediate risk study.  Echocardiogram showed normal LVEF.  Clinically Dario actually reports he is completely chest pain-free he denies any further chest pains that he was reporting at last visit he still reports being active working regularly denies any palpitations, dizziness, or syncope.        Allergies   Allergen Reactions    Diphenhydramine Delirium   :      Current Outpatient Medications:     acetaminophen (TYLENOL) 325 MG tablet, Take 2 tablets by mouth Every 4 (Four) Hours As Needed for Mild Pain or Fever (temperature greater than 101F)., Disp: 90 tablet, Rfl: 1    aspirin 81 MG EC tablet, Take 1 tablet by mouth Daily., Disp: 90 tablet, Rfl: 3    atorvastatin (LIPITOR) 20 MG tablet, Take 1 tablet by mouth Every Night.  (Patient not taking: Reported on 10/29/2024), Disp: 90 tablet, Rfl: 3    buPROPion SR (Wellbutrin SR) 150 MG 12 hr tablet, Take 1 tablet by mouth Daily., Disp: , Rfl:     cyclobenzaprine (FLEXERIL) 10 MG tablet, Take 1 tablet by mouth 2 (Two) Times a Day As Needed., Disp: , Rfl:     Inclisiran Sodium (Leqvio) 284 MG/1.5ML solution prefilled syringe, Inject 1.5 mL under the skin into the appropriate area as directed Every 6 (Six) Months., Disp: 1.5 mL, Rfl: 0    Inclisiran Sodium 284 MG/1.5ML solution prefilled syringe, Inject 1.5 mL under the skin into the appropriate area as directed Every 3 (Three) Months., Disp: 1.5 mL, Rfl: 1    lisinopril (PRINIVIL,ZESTRIL) 5 MG tablet, Take 1 tablet by mouth Daily., Disp: 90 tablet, Rfl: 3    nitroglycerin (NITROSTAT) 0.4 MG SL tablet, DISSOLVE 1 TABLET UNDER THE TONGUE AS NEEDED FOR ANGINA MAY REPEAT UP TO THREE DOSES AFTER 5 MINUTES, Disp: 25 tablet, Rfl: 2    pantoprazole (Protonix) 40 MG EC tablet, Take 1 tablet by mouth Daily. (Patient not taking: Reported on 3/19/2025), Disp: 30 tablet, Rfl: 0    ticagrelor (BRILINTA) 90 MG tablet tablet, Take 1 tablet by mouth 2 (Two) Times a Day., Disp: 180 tablet, Rfl: 3    varenicline (CHANTIX) 1 MG tablet, TAKE 1 TABLET BY MOUTH TWICE DAILY FOR 56 DAYS, Disp: , Rfl:     The following portions of the patient's history were reviewed and updated as appropriate: allergies, current medications, past family history, past medical history, past social history, past surgical history and problem list.    Social History     Tobacco Use    Smoking status: Every Day     Current packs/day: 1.50     Average packs/day: 1.5 packs/day for 15.0 years (22.5 ttl pk-yrs)     Types: Cigarettes     Passive exposure: Current    Smokeless tobacco: Never   Vaping Use    Vaping status: Never Used   Substance Use Topics    Alcohol use: Never    Drug use: Yes     Types: Marijuana         Objective   Vitals:    03/19/25 1401   BP: 129/79   Pulse: 76   SpO2: 97%  "  Height: 167.6 cm (66\")     Body mass index is 22.63 kg/m².    ROS    Constitutional:       General: Not in acute distress.     Appearance: Healthy appearance. Well-developed and not in distress. Not diaphoretic.   Eyes:      Conjunctiva/sclera: Conjunctivae normal.      Pupils: Pupils are equal, round, and reactive to light.   HENT:      Head: Normocephalic and atraumatic.   Neck:      Vascular: No carotid bruit or JVD.   Pulmonary:      Effort: Pulmonary effort is normal. No respiratory distress.      Breath sounds: Normal breath sounds.   Cardiovascular:      Normal rate. Regular rhythm.   Edema:     Peripheral edema absent.   Skin:     General: Skin is cool.   Neurological:      Mental Status: Alert, oriented to person, place, and time and oriented to person, place and time.         Lab Results   Component Value Date     (L) 08/10/2023    K 3.9 08/10/2023    CL 99 08/10/2023    CO2 20.2 (L) 08/10/2023    BUN 10 08/10/2023    CREATININE 0.93 08/10/2023    GLUCOSE 127 (H) 08/10/2023    CALCIUM 8.6 08/10/2023    AST 21 07/27/2023    ALT 15 07/27/2023    ALKPHOS 67 07/27/2023     No results found for: \"CKTOTAL\"  Lab Results   Component Value Date    WBC 9.42 08/10/2023    HGB 11.5 (L) 08/10/2023    HCT 33.5 (L) 08/10/2023     08/10/2023     Lab Results   Component Value Date    INR 0.89 (L) 06/12/2019     No results found for: \"MG\"  Lab Results   Component Value Date    TSH 1.250 07/27/2023    TRIG 49 04/17/2024    HDL 51 04/17/2024    LDL 54 04/17/2024      No results found for: \"BNP\"    During this visit the following were done:  Labs Reviewed []    Labs Ordered []    Radiology Reports Reviewed []    Radiology Ordered []    PCP Records Reviewed []    Referring Provider Records Reviewed []    ER Records Reviewed []    Hospital Records Reviewed []    History Obtained From Family []    Radiology Images Reviewed []    Other Reviewed []    Records Requested []       Procedures    Assessment & Plan    " Diagnosis Plan   1. ASCVD (arteriosclerotic cardiovascular disease)        2. Dyslipidemia, goal LDL below 55        3. Essential hypertension                 Recommendations:  ASCVD  Discussed results of stress test and echocardiogram.  Although stress test is abnormal he is completely asymptomatic now so I will continue with GDMT.  Continue with aspirin, Leqvio, lisinopril, Brilinta.  Dyslipidemia  Currently on Leqvio not controlled add like to try to transition him to Repatha if possible.  Essential hypertension  Well-controlled    No follow-ups on file.    As always, I appreciate very much the opportunity to participate in the cardiovascular care of your patients.      With Best Regards,    Bob Maldonado PA-C

## 2025-06-23 ENCOUNTER — TELEPHONE (OUTPATIENT)
Dept: PHARMACY | Facility: HOSPITAL | Age: 50
End: 2025-06-23
Payer: COMMERCIAL

## 2025-06-23 NOTE — TELEPHONE ENCOUNTER
Jonathon called the specialty clinic today to update us on the status of his insurance He is still in the process of getting his insurance reinstated. He reports sending them what they ask for and they request more information each time.    I let him know to continue calling to update the lipid clinic, and as soon as he can get active insurance we can restart cholesterol treatment.    Xochitl Boss, PharmD  06/23/25  14:30 EDT

## 2025-07-15 ENCOUNTER — TELEPHONE (OUTPATIENT)
Dept: PHARMACY | Facility: HOSPITAL | Age: 50
End: 2025-07-15
Payer: COMMERCIAL

## 2025-07-15 NOTE — TELEPHONE ENCOUNTER
The patient has missed several appointments for his 4th dose of Leqvio. The patient was working on reinstating his insurance and could not make the appointments due to no insurance. At this time the patient has went three months past his due date for his Leqvio. He will have to restart the Leqvio process if he is referred back to us when his insurance is fixed.

## (undated) DEVICE — ST EXT IV SMRTSTE 2VLV FIX M LL 6ML 41

## (undated) DEVICE — RUNTHROUGH NS EXTRA FLOPPY PTCA GUIDEWIRE: Brand: RUNTHROUGH

## (undated) DEVICE — PAD, DEFIB, ADULT, RADIOTRANS, ZOLL: Brand: MEDLINE

## (undated) DEVICE — DRSNG SURESITE WNDW 4X4.5

## (undated) DEVICE — DEV INFL MONARCH 25W

## (undated) DEVICE — Device: Brand: MEDEX

## (undated) DEVICE — MINI TREK CORONARY DILATATION CATHETER 2.0 MM X 15 MM / RAPID-EXCHANGE: Brand: MINI TREK

## (undated) DEVICE — DEV COMPR RADL PRELUDESYNCEZ 30ML 32CM

## (undated) DEVICE — ST INF PRI SMRTSTE 20DRP 2VLV 24ML 117

## (undated) DEVICE — RUNWAY RADL W/TOP PAD

## (undated) DEVICE — Device: Brand: OMNIWIRE PRESSURE GUIDE WIRE

## (undated) DEVICE — LN INJ CONTRST FLXCIL HP F/M LL 1200PSI10

## (undated) DEVICE — GUIDELINER CATHETERS ARE INTENDED TO BE USED IN CONJUNCTION WITH GUIDE CATHETERS TO ACCESS DISCRETE REGIONS OF THE CORONARY AND/OR PERIPHERAL VASCULATURE, AND TO FACILITATE PLACEMENT OF INTERVENTIONAL DEVICES.: Brand: GUIDELINER® V3 CATHETER

## (undated) DEVICE — 6F .070 XB 3.5 100CM: Brand: VISTA BRITE TIP

## (undated) DEVICE — GW INQW FIX/CORE PTFE J/3MM .035 260CM

## (undated) DEVICE — LN FLTR ORL/NASL MICROSTREAM NONINTUB A/LNG

## (undated) DEVICE — ADULT DISPOSABLE SINGLE-PATIENT USE PULSE OXIMETER SENSOR: Brand: NONIN

## (undated) DEVICE — A2000 MULTI-USE SYRINGE KIT, P/N 701277-003KIT CONTENTS: 100ML CONTRAST RESERVOIR AND TUBING WITH CONTRAST SPIKE AND CLAMP: Brand: A2000 MULTI-USE SYRINGE KIT

## (undated) DEVICE — RADIFOCUS OPTITORQUE ANGIOGRAPHIC CATHETER: Brand: OPTITORQUE

## (undated) DEVICE — CATH INTRAVAS ULTRASND EAGLE EYE 2.9FR

## (undated) DEVICE — PK CATH CARD 70

## (undated) DEVICE — GLIDESHEATH SLENDER STAINLESS STEEL KIT: Brand: GLIDESHEATH SLENDER

## (undated) DEVICE — TR BAND RADIAL ARTERY COMPRESSION DEVICE: Brand: TR BAND

## (undated) DEVICE — NC TREK NEO™ CORONARY DILATATION CATHETER 3.75 MM X 20 MM / RAPID-EXCHANGE: Brand: NC TREK NEO™

## (undated) DEVICE — GW PRESSUREWIRE X WIRELESS FFR 175CM

## (undated) DEVICE — DRAPE, RADIAL, STERILE: Brand: MEDLINE

## (undated) DEVICE — MODEL AT P65, P/N 701554-001KIT CONTENTS: HAND CONTROLLER, 3-WAY HIGH-PRESSURE STOPCOCK WITH ROTATING END AND PREMIUM HIGH-PRESSURE TUBING: Brand: ANGIOTOUCH® KIT

## (undated) DEVICE — TREK CORONARY DILATATION CATHETER 2.50 MM X 15 MM / RAPID-EXCHANGE: Brand: TREK

## (undated) DEVICE — 6F .070 JR 4 100CM: Brand: CORDIS

## (undated) DEVICE — CATH VENT MIV RADL PIG LNG TIP 5F 110CM

## (undated) DEVICE — NC TREK NEO™ CORONARY DILATATION CATHETER 3.50 MM X 12 MM / RAPID-EXCHANGE: Brand: NC TREK NEO™